# Patient Record
Sex: FEMALE | Race: BLACK OR AFRICAN AMERICAN | NOT HISPANIC OR LATINO | Employment: OTHER | URBAN - METROPOLITAN AREA
[De-identification: names, ages, dates, MRNs, and addresses within clinical notes are randomized per-mention and may not be internally consistent; named-entity substitution may affect disease eponyms.]

---

## 2021-03-11 PROBLEM — Z15.89 MTHFR MUTATION: Status: ACTIVE | Noted: 2021-03-11

## 2021-03-11 PROBLEM — D68.59 HYPERCOAGULABLE STATE (HCC): Status: ACTIVE | Noted: 2021-03-11

## 2021-03-11 PROBLEM — R93.1 ELEVATED CORONARY ARTERY CALCIUM SCORE: Status: ACTIVE | Noted: 2021-03-11

## 2021-03-11 PROBLEM — E78.5 DYSLIPIDEMIA: Status: ACTIVE | Noted: 2021-03-11

## 2021-03-17 PROBLEM — Z92.89 HISTORY OF STRESS TEST: Status: ACTIVE | Noted: 2021-03-17

## 2021-03-17 PROBLEM — R93.1 ELEVATED CORONARY ARTERY CALCIUM SCORE: Chronic | Status: RESOLVED | Noted: 2021-03-11 | Resolved: 2021-03-17

## 2021-03-17 PROBLEM — R93.1 ELEVATED CORONARY ARTERY CALCIUM SCORE: Chronic | Status: ACTIVE | Noted: 2021-03-11

## 2021-03-17 PROBLEM — E78.5 DYSLIPIDEMIA: Chronic | Status: ACTIVE | Noted: 2021-03-11

## 2021-03-18 ENCOUNTER — OFFICE VISIT (OUTPATIENT)
Dept: CARDIOLOGY | Facility: CLINIC | Age: 51
End: 2021-03-18

## 2021-03-18 VITALS
RESPIRATION RATE: 18 BRPM | WEIGHT: 133 LBS | DIASTOLIC BLOOD PRESSURE: 72 MMHG | OXYGEN SATURATION: 99 % | HEIGHT: 66 IN | BODY MASS INDEX: 21.38 KG/M2 | HEART RATE: 70 BPM | SYSTOLIC BLOOD PRESSURE: 110 MMHG

## 2021-03-18 DIAGNOSIS — I25.10 CORONARY ARTERY DISEASE INVOLVING NATIVE CORONARY ARTERY OF NATIVE HEART WITHOUT ANGINA PECTORIS: Primary | Chronic | ICD-10-CM

## 2021-03-18 DIAGNOSIS — E78.5 DYSLIPIDEMIA: Chronic | ICD-10-CM

## 2021-03-18 PROCEDURE — 93000 ELECTROCARDIOGRAM COMPLETE: CPT | Performed by: INTERNAL MEDICINE

## 2021-03-18 PROCEDURE — 99204 OFFICE O/P NEW MOD 45 MIN: CPT | Performed by: INTERNAL MEDICINE

## 2021-03-18 NOTE — PROGRESS NOTES
Chief Complaint  Establish Care, Coronary Artery Disease (Noted on CT 2020), and Hyperlipidemia    Subjective    History of Present Illness    I had the pleasure of seeing Ms. Arellano in the office today.  She is a 51-year-old female who presents to establish cardiac care.  She has a positive family history of premature cardiac disease, dyslipidemia, prediabetes and positive MTHFR gene.  She has recently moved from [place] to contact with to care for her mother.  She is under a great deal of stress.  While in [place] she had a stress echo in 2019 which was normal.  A CTA of the coronary arteries was performed in 2020..  Her total calcium score was 50.  The RCA was normal, PLV was normal, PDA was normal and left main was normal.  The LAD demonstrated calcified plaque in the proximal segment with less than 30% stenosis.  The mid and distal segments were normal.  The circumflex and obtuse marginal were normal.  The aortic and mitral valves were normal.      Ms. Arellano is typically a very active person.  She does enjoy running.  She states that she has not been able to run very much since she has moved back to [place] secondary to her mother's health.  She has noticed some chest discomfort which she describes as a dull sensation in the anterior precordium.  It occurs with rest and exertion.  She does feel it is most likely related to stress.  Duration of the discomfort is just a few minutes.  There is no associated shortness of air, nausea, vomiting or diaphoresis.  She does not complain of shortness of air with exertion or at rest.  She denies orthopnea or paroxysmal nocturnal dyspnea.  She is not experiencing palpitations, dizziness or near syncope.  She has had no history of venous or arterial clots.    Her father had his first myocardial infarction in his fourth decade of life.  He  at age 54.  Her mother has diabetes.  She has a brother has coronary disease and is status post previous  "PCI.  She does not have any known thyroid disease.  No history of rheumatic fever.  No history of tobacco use.  She drinks alcohol occasionally.    Objective   Vital Signs:   /72 (BP Location: Left arm, Patient Position: Sitting, Cuff Size: Large Adult)   Pulse 70   Resp 18   Ht 167.6 cm (66\")   Wt 60.3 kg (133 lb)   SpO2 99%   BMI 21.47 kg/m²     Vitals and nursing note reviewed.   Constitutional:       General: Not in acute distress.     Appearance: Healthy appearance. Well-developed and not in distress. Not diaphoretic.   Eyes:      General: No scleral icterus.     Conjunctiva/sclera: Conjunctivae normal.      Pupils: Pupils are equal, round, and reactive to light.   HENT:      Head: Normocephalic and atraumatic.   Neck:      Thyroid: Thyroid normal. No thyromegaly.      Vascular: JVD normal.      Lymphadenopathy: No cervical adenopathy.   Pulmonary:      Effort: Pulmonary effort is normal. No respiratory distress.      Breath sounds: Normal breath sounds. No wheezing. No rales.   Cardiovascular:      PMI at left midclavicular line. Normal rate. Regular rhythm. Normal S1. Normal S2.      Murmurs: There is no murmur.      No gallop. No S3 and S4 gallop. No click. No rub.   Pulses:     Intact distal pulses. No decreased pulses.   Edema:     Peripheral edema absent.   Abdominal:      General: Bowel sounds are normal. There is no distension or abdominal bruit.      Palpations: Abdomen is soft. There is no hepatomegaly or abdominal mass.      Tenderness: There is no abdominal tenderness. There is no guarding or rebound.   Musculoskeletal: Normal range of motion.      Cervical back: Normal range of motion and neck supple. Skin:     General: Skin is warm and dry.      Coloration: Skin is not pale.      Findings: No rash.   Neurological:      Mental Status: Alert, oriented to person, place, and time and oriented to person, place and time.      Coordination: Coordination normal.      Gait: Gait is intact. "   Psychiatric:         Behavior: Behavior normal.         Result Review :   The following data was reviewed by: Philomena Hines MD on 03/18/2021:  CMP    CMP 2/3/21   Glucose 90   BUN 10   Creatinine 0.7   Sodium 138   Potassium 3.9   Chloride 102   Calcium 9.4   Albumin 4.5   Total Bilirubin 0.5   Alkaline Phosphatase 96   AST (SGOT) 22   ALT (SGPT) 18               TSH    TSH 2/3/21   TSH 0.468         Lab from 2/2021: Triglycerides were 48, total cholesterol 173, HDL 68 and LDL 95.       ECG 12 Lead    Date/Time: 3/18/2021 8:09 PM  Performed by: Philomena Hines MD  Authorized by: Philomena Hines MD   Previous ECG: no previous ECG available  Comments: Normal sinus rhythm, rate 61.  Probable left atrial abnormality.  Right ventricular conduction delay.              Assessment and Plan    1. Coronary artery disease involving native coronary artery of native heart without angina pectoris  CT angiogram in August 2020 revealed a total calcium score 50.  Her LAD demonstrated a proximal 30% calcified lesion otherwise vessels were normal.  She is having chest discomfort which is atypical for myocardial ischemia.  She would benefit from statin therapy given her elevated coronary calcium score and LAD lesion.  She is interested in taking red yeast rice which has a naturally occurring statin.  Her LDL goal should be less than 70.  I have explained to her that I do not know that red yeast rice will be able to obtain this goal.  I have suggested that she take an aspirin 81 mg daily unless she has bleeding issues.  Her blood pressure is controlled.  She is going to make it a goal to start her exercise pattern again.    2. Dyslipidemia  Ms. Arellano states that she has modified her diet and her LDL has decreased from 102 to 95.  Given her risk factors for coronary disease and abnormal CTA, her LDL goal should be less than 70.    Ms. Arellano is stable from a cardiovascular standpoint.  She does have good health habits.  She  should continue primary preventive measures for underlying coronary disease.        Follow Up   No follow-ups on file.  Patient was given instructions and counseling regarding her condition or for health maintenance advice. Please see specific information pulled into the AVS if appropriate.

## 2021-06-22 NOTE — PROGRESS NOTES
"Subjective   Chief Complaint   Patient presents with   • Chest Pain     right sided   • Abdominal Pain     RUQ   • Prediabetes       History of Present Illness   51 y.o. female presents as a new patient to establish care and discuss multiple issues including right sided chest pain; positive smooth muscle AB (referred to Dr. Leonard by Dr. Lakhani but not yet scheduled); RUQ pain; and prediabetes. Moved from [place] to care for her aged mother. Referred by Dr. Philomena Neff. Previously evaluated by Dr. Memo Lakhani.     Right sided chest pain (points to right upper chest closer to right axilla) started last night that lasted 10-15 minutes. Noticed it when she got up to use the restroom. Able to go back to sleep. Radiated to her right neck. Not yet started B-ASA or red yeast. Pain is gone this morning. It made her nervous. Hurt most when she exhaled. She has been lifting things and is taking care of her 86 yo mother. \"Raising the windows is a chore.\" Recently started running again. Working on getting back up to a mile--she's at 17 minutes to run a mile. Denies CP or dyspnea with running.     RUQ after eating ongoing for a few months. Denies nausea, vomiting or diarrhea. Still has her GB. Reports negative US of RUQ 21.  Testing (at [facility]) prior to relocating to [place] revealed +smooth muscle AB but negative MEMO. +MTHFR gene, elevated GGT and CRP (23). Repeated CRP with Dr. Lakhani negative as was ESR. Anti-smooth muscle AB+ and she was advised to see Dr. Leonard--not yet scheduled. Also reports livedo reticularis on legs though it has resolved at this time.     +MTHFR gene. Denies H/O blood clots, stroke or miscarriage. Dad  at age 54 with first MI in his 4th decade. Brother with CAD and PCI. Mother with diabetes. She has made dietary changes and her last A1C was 5.7%. She is followed by Dr. Philomena Neff with recent LDL 95 (goal less than 70 given multiple risk factors and 30% plaque in her LAD); statin " advised but declined infavor of more holistic approach. Advised to start B-ASA but she is reluctant to do so.     Followed by Dr. Leonard--ENT--for chronic vertigo and recurent sinus infections. Recently purchase a humidifier that helped and she has learned the Epley maneuver--this too helps.     P/P with Dr. Solis.     Patient Active Problem List   Diagnosis   • Dyslipidemia   • MTHFR mutation (CMS/HCC)   • Hypercoagulable state (CMS/HCC)   • CAD (coronary artery disease)   • History of stress test       No Known Allergies    Current Outpatient Medications on File Prior to Visit   Medication Sig Dispense Refill   • ascorbic acid (VITAMIN C) 250 MG chewable tablet chewable tablet Chew.     • Calcium Carb-Cholecalciferol 1000-800 MG-UNIT tablet Take  by mouth.     • cholecalciferol (Cholecalciferol) 25 MCG (1000 UT) tablet Take 2,000 Units by mouth Daily.     • folic acid (FOLVITE) 1 MG tablet      • vitamin B-12 (CYANOCOBALAMIN) 1000 MCG tablet Take 1,000 mcg by mouth Daily.     • estradiol (MINIVELLE, VIVELLE-DOT) 0.05 MG/24HR patch PLACE 1 PATCH ON THE SKIN TWICE A WEEK     • Progesterone (PROMETRIUM) 100 MG capsule Take 100 mg by mouth Daily.       No current facility-administered medications on file prior to visit.       Past Medical History:   Diagnosis Date   • CAD (coronary artery disease)    • Hyperlipidemia        Family History   Problem Relation Age of Onset   • Diabetes Mother    • Heart attack Father    • Heart disease Father    • Diabetes Sister    • Heart disease Brother    • Heart disease Maternal Grandmother    • Heart attack Maternal Grandmother        Social History     Socioeconomic History   • Marital status: Single     Spouse name: Not on file   • Number of children: Not on file   • Years of education: Not on file   • Highest education level: Not on file   Tobacco Use   • Smoking status: Never Smoker   • Smokeless tobacco: Never Used   Substance and Sexual Activity   • Alcohol use: Yes      "Alcohol/week: 1.0 standard drinks     Types: 1 Glasses of wine per week   • Drug use: Never   • Sexual activity: Defer       Past Surgical History:   Procedure Laterality Date   • MYOMECTOMY  2008       The following portions of the patient's history were reviewed and updated as appropriate: problem list, allergies, current medications, past medical history, past family history, past social history and past surgical history.    Review of Systems    Immunization History   Administered Date(s) Administered   • COVID-19 (PFIZER) 04/08/2021, 04/29/2021       Objective   Vitals:    06/24/21 0819   BP: 98/58   Pulse: 72   Resp: 14   Temp: 97.5 °F (36.4 °C)   Weight: 61.7 kg (136 lb)   Height: 167.6 cm (66\")     Body mass index is 21.95 kg/m².  Physical Exam  Constitutional:       Appearance: Normal appearance. She is normal weight.   HENT:      Head: Normocephalic and atraumatic.      Mouth/Throat:      Mouth: Mucous membranes are moist.      Pharynx: Oropharynx is clear. No oropharyngeal exudate or posterior oropharyngeal erythema.   Neck:      Thyroid: No thyroid mass, thyromegaly or thyroid tenderness.   Cardiovascular:      Rate and Rhythm: Normal rate and regular rhythm.      Heart sounds: Normal heart sounds. No murmur heard.     Pulmonary:      Effort: Pulmonary effort is normal.      Breath sounds: Normal breath sounds.   Chest:      Comments: Right ICS TTP at RSB.   Abdominal:      General: Abdomen is flat. Bowel sounds are normal. There is no distension.      Palpations: Abdomen is soft.      Tenderness: There is no abdominal tenderness.   Musculoskeletal:      Cervical back: Neck supple.      Comments: Ambulates without assistance; no clubbing, cyanosis or edema.    Lymphadenopathy:      Cervical: No cervical adenopathy.   Skin:     General: Skin is warm and dry.   Neurological:      Mental Status: She is alert.   Psychiatric:         Mood and Affect: Mood normal.         Behavior: Behavior normal. "         Procedures    Assessment/Plan   Diagnoses and all orders for this visit:    1. Right upper quadrant pain (Primary)  Comments:  Negative US GB. HIDA scan advised. Previous LFTs in February negative. Elevated GGT.   Orders:  -     NM HIDA Scan With Pharmacological Intervention    2. Prediabetes  Comments:  Recent A1C 5.7%. Diabetes education advised.   Orders:  -     Ambulatory Referral to Diabetic Education  -     Hemoglobin A1c; Future  -     Basic Metabolic Panel; Future    3. Right-sided chest wall pain  Comments:  Reproducible on exam. Likely costochondritis. Tylenol prn.     4. Arthralgia, unspecified joint  Comments:  Previous MEMO negative. Mother with RA. Recent RF negative as were ESR and CRP. Check anti-CCP prior to follow up.   Orders:  -     Cyclic Citrul Peptide Antibody, IgG / IgA; Future    5. Family history of rheumatoid arthritis  -     Cyclic Citrul Peptide Antibody, IgG / IgA; Future    6. Coronary artery disease involving native coronary artery of native heart without angina pectoris  Comments:  Followed by Dr. Philomena Neff. +MTHFR gene. 30% plaque LAD. +FH CAD with father dying at 54 with MI. LDL goal less than 70.   Orders:  -     rosuvastatin (Crestor) 5 MG tablet; Take 1 tablet by mouth Daily.  Dispense: 30 tablet; Refill: 1    7. MTHFR mutation (CMS/HCC)    8. Dyslipidemia  Comments:  LDL goal less than 70 given multiple risk factors; agreeable to start Crestor 5 mg once weekly  x6 wks & slowly advance with LFTs and FLP in 6 wks  Orders:  -     rosuvastatin (Crestor) 5 MG tablet; Take 1 tablet by mouth Daily.  Dispense: 30 tablet; Refill: 1  -     Hepatic Function Panel; Future    9. Microcytosis  Comments:  Without anemia with previous labs in February. Check CBC, iron panel and ferritin with upcoming lab work.   Orders:  -     CBC & Differential; Future  -     Iron Profile; Future  -     Ferritin; Future    10. Need for hepatitis C screening test  -     Hepatitis C Antibody;  Future    11. Positive Smooth Muscle AB: Advised to schedule with Dr. Leonard (GI) as previously advised.     Tdap in follow up.    68 minutes spent reviewing records, evaluating patient, counseling and coordinating care.     Records reviewed.     Return in about 6 weeks (around 8/5/2021) for Lab B4 FUP.

## 2021-06-24 ENCOUNTER — OFFICE VISIT (OUTPATIENT)
Dept: INTERNAL MEDICINE | Facility: CLINIC | Age: 51
End: 2021-06-24

## 2021-06-24 VITALS
HEIGHT: 66 IN | SYSTOLIC BLOOD PRESSURE: 98 MMHG | WEIGHT: 136 LBS | HEART RATE: 72 BPM | TEMPERATURE: 97.5 F | DIASTOLIC BLOOD PRESSURE: 58 MMHG | BODY MASS INDEX: 21.86 KG/M2 | RESPIRATION RATE: 14 BRPM

## 2021-06-24 DIAGNOSIS — R10.11 RIGHT UPPER QUADRANT PAIN: Primary | ICD-10-CM

## 2021-06-24 DIAGNOSIS — R07.89 RIGHT-SIDED CHEST WALL PAIN: ICD-10-CM

## 2021-06-24 DIAGNOSIS — E78.5 DYSLIPIDEMIA: Chronic | ICD-10-CM

## 2021-06-24 DIAGNOSIS — Z11.59 NEED FOR HEPATITIS C SCREENING TEST: ICD-10-CM

## 2021-06-24 DIAGNOSIS — M25.50 ARTHRALGIA, UNSPECIFIED JOINT: Chronic | ICD-10-CM

## 2021-06-24 DIAGNOSIS — I25.10 CORONARY ARTERY DISEASE INVOLVING NATIVE CORONARY ARTERY OF NATIVE HEART WITHOUT ANGINA PECTORIS: Chronic | ICD-10-CM

## 2021-06-24 DIAGNOSIS — Z15.89 MTHFR MUTATION: ICD-10-CM

## 2021-06-24 DIAGNOSIS — R71.8 MICROCYTOSIS: ICD-10-CM

## 2021-06-24 DIAGNOSIS — R73.03 PREDIABETES: Chronic | ICD-10-CM

## 2021-06-24 DIAGNOSIS — Z82.61 FAMILY HISTORY OF RHEUMATOID ARTHRITIS: ICD-10-CM

## 2021-06-24 PROCEDURE — 99215 OFFICE O/P EST HI 40 MIN: CPT | Performed by: NURSE PRACTITIONER

## 2021-06-24 RX ORDER — LANOLIN ALCOHOL/MO/W.PET/CERES
1000 CREAM (GRAM) TOPICAL DAILY
COMMUNITY
End: 2022-03-16

## 2021-06-24 RX ORDER — MELATONIN
2000 DAILY
COMMUNITY
End: 2022-08-04

## 2021-06-24 RX ORDER — PROGESTERONE 100 MG/1
100 CAPSULE ORAL DAILY
COMMUNITY
End: 2021-09-17

## 2021-06-24 RX ORDER — ESTRADIOL 0.05 MG/D
FILM, EXTENDED RELEASE TRANSDERMAL
COMMUNITY
Start: 2021-04-14 | End: 2021-09-17

## 2021-06-24 RX ORDER — ROSUVASTATIN CALCIUM 5 MG/1
5 TABLET, COATED ORAL DAILY
Qty: 30 TABLET | Refills: 1 | Status: SHIPPED | OUTPATIENT
Start: 2021-06-24 | End: 2021-07-09 | Stop reason: SDUPTHER

## 2021-06-24 RX ORDER — FOLIC ACID 1 MG/1
1 TABLET ORAL DAILY
COMMUNITY
Start: 2021-06-03 | End: 2022-02-03 | Stop reason: SDUPTHER

## 2021-07-02 ENCOUNTER — TELEPHONE (OUTPATIENT)
Dept: CARDIOLOGY | Facility: CLINIC | Age: 51
End: 2021-07-02

## 2021-07-02 DIAGNOSIS — E78.5 DYSLIPIDEMIA: Primary | ICD-10-CM

## 2021-07-09 ENCOUNTER — TELEPHONE (OUTPATIENT)
Dept: INTERNAL MEDICINE | Facility: CLINIC | Age: 51
End: 2021-07-09

## 2021-07-09 DIAGNOSIS — I25.10 CORONARY ARTERY DISEASE INVOLVING NATIVE CORONARY ARTERY OF NATIVE HEART WITHOUT ANGINA PECTORIS: Chronic | ICD-10-CM

## 2021-07-09 DIAGNOSIS — E78.5 DYSLIPIDEMIA: Chronic | ICD-10-CM

## 2021-07-09 RX ORDER — ROSUVASTATIN CALCIUM 5 MG/1
5 TABLET, COATED ORAL DAILY
Qty: 90 TABLET | Refills: 1 | Status: SHIPPED | OUTPATIENT
Start: 2021-07-09 | End: 2022-02-03 | Stop reason: SDUPTHER

## 2021-07-09 NOTE — TELEPHONE ENCOUNTER
Her LDL remains in the 90s.  Would increase rosuvastatin to 10 mg daily.  Will need a repeat lipid profile in approximately 3 months with an ALT and AST.  Those values were mildly elevated but have been elevated in the past as well

## 2021-07-09 NOTE — TELEPHONE ENCOUNTER
Spoke to pt today; she will schedule with us once we have dietitian in place and taking appts (3-4 weeks); she also asked if Dr Angel's office could refer somewhere quicker, she may do both appts.  Patient wants to know if there is anyone else to refer to?

## 2021-07-09 NOTE — TELEPHONE ENCOUNTER
FYI  Patient never started the Rosuvastatin 5 mg, she agrees to start the medication & repeat labs in 3 months.

## 2021-07-26 ENCOUNTER — HOSPITAL ENCOUNTER (EMERGENCY)
Facility: HOSPITAL | Age: 51
Discharge: HOME | End: 2021-07-26

## 2021-07-26 VITALS
HEART RATE: 72 BPM | RESPIRATION RATE: 16 BRPM | DIASTOLIC BLOOD PRESSURE: 58 MMHG | TEMPERATURE: 98.4 F | SYSTOLIC BLOOD PRESSURE: 91 MMHG | OXYGEN SATURATION: 100 %

## 2021-07-26 PROCEDURE — 99211 OFF/OP EST MAY X REQ PHY/QHP: CPT

## 2021-08-10 ENCOUNTER — TELEPHONE (OUTPATIENT)
Dept: CARDIOLOGY | Facility: CLINIC | Age: 51
End: 2021-08-10

## 2021-08-10 PROBLEM — Z15.89 MTHFR MUTATION: Chronic | Status: ACTIVE | Noted: 2021-03-11

## 2021-08-10 NOTE — PROGRESS NOTES
"Chief Complaint  Coronary Artery Disease    Subjective    History of Present Illness      You have chosen to receive care through a telephone visit. Do you consent to use a telephone visit for your medical care today? Yes  I had a telephone visit with Ms. Arellano today.    She is a 51-year-old female with a positive family history of premature cardiac disease, dyslipidemia, prediabetes and positive MTHFR gene.  She had a stress echo in September 2019 which was normal.  She had a coronary calcium scan with a total score of 50.    Ms. Arellano states that for the past 2 weeks she has been experiencing intermittent chest discomfort.  She states that the discomfort begins in the middle of her chest and expands and she can feel this in the back of her chest.  She describes it as a sharp and burning type of discomfort and the duration is 5 to 10 minutes.  It is not precipitated by exertion but occurs at rest.  She also states that she has occasional shortness of air where she feels like she has a natural breath for a few seconds.  She does run on a routine basis and has a sensation of shortness of breath when she begins to run but then the longer she runs that he is here is breathing.  She has no discomfort in her chest when she is running.  She also complains of intermittent palpitations.  The palpitations are not causing dizziness or near syncope.  She also seems very sensitive dizziness.  The dizziness is not precipitated by any particular measures.  She just describes as a sensation of lightheadedness.      Objective   Vital Signs:   Ht 167.6 cm (66\")   Wt 61.2 kg (135 lb)   BMI 21.79 kg/m²     Physical Exam    Physical exam was not performed secondary to telephone visit  Result Review :   The following data was reviewed by: Philomena Hines MD on 08/11/2021:  CMP    CMP 2/3/21 8/9/21 8/9/21     1313 1313   Glucose 90  94   BUN 10  12   Creatinine 0.7  0.87   eGFR Non  Am   69   eGFR African Am   84   Sodium 138  " 142   Potassium 3.9  4.7   Chloride 102  104   Calcium 9.4  10.4   Total Protein  7.6    Albumin 4.5 4.60    Total Bilirubin 0.5 0.4    Alkaline Phosphatase 96 134 (A)    AST (SGOT) 22 40 (A)    ALT (SGPT) 18 60 (A)    (A) Abnormal value       Comments are available for some flowsheets but are not being displayed.               TSH    TSH 2/3/21   TSH 0.468         Lipid profile from 7/1/2021: Total cholesterol 177, HDL 66, triglycerides 88, LDL 93.  AST was 39 and ALT was 47            Assessment and Plan    1. Chest discomfort  Ms. Arellano has been experiencing chest discomfort.  The character of the discomfort is typical of angina.  The presentation of the discomfort is atypical.  Symptoms do not occur with exertion but occur with rest.  She will need further evaluation and I will schedule her to have a stress nuclear study.  She did have a CT angiogram 2019 which revealed nonobstructive disease.  There was a 30% stenosis in the proximal LAD.    2. Dyslipidemia  Ms. Arellano has been reluctant to start treatment for her LDL.  She has tried diet modification and exercise.  Her recent lipid profile from July of March 2021 revealed her total cholesterol being 177, HDL 66, triglycerides 88 and LDL 93.  She was started on Crestor 5 mg daily in early July results of the lipid profile returned.    3. Elevated LFTs  She had recent lab work on August 9, 2021.  Her ALT was 60 and AST was 40.  Her alkaline phosphatase was 134.  She was started on rosuvastatin 5 mg daily approximately 1 month ago.  However, her AST and ALT were elevated prior to initiation of statin therapy.  Her ALT was 47 and AST 39.  She is going to be evaluated further.    4.  Palpitations  She is having occasional palpitations.  The symptoms are not causing dizziness or near syncope.  We will watch for now.  If the palpitations become more frequent we will place a monitor.    Length of telephone visit was 12 minutes 11 seconds.                Follow Up   No  follow-ups on file.  Patient was given instructions and counseling regarding her condition or for health maintenance advice. Please see specific information pulled into the AVS if appropriate.

## 2021-08-11 ENCOUNTER — TELEMEDICINE (OUTPATIENT)
Dept: CARDIOLOGY | Facility: CLINIC | Age: 51
End: 2021-08-11

## 2021-08-11 ENCOUNTER — OFFICE (OUTPATIENT)
Dept: URBAN - METROPOLITAN AREA CLINIC 75 | Facility: CLINIC | Age: 51
End: 2021-08-11
Payer: MEDICARE

## 2021-08-11 VITALS — BODY MASS INDEX: 21.69 KG/M2 | HEIGHT: 66 IN | WEIGHT: 135 LBS

## 2021-08-11 VITALS
SYSTOLIC BLOOD PRESSURE: 102 MMHG | HEIGHT: 66 IN | HEART RATE: 76 BPM | OXYGEN SATURATION: 99 % | RESPIRATION RATE: 18 BRPM | DIASTOLIC BLOOD PRESSURE: 68 MMHG | WEIGHT: 136 LBS

## 2021-08-11 DIAGNOSIS — R79.89 ELEVATED LFTS: ICD-10-CM

## 2021-08-11 DIAGNOSIS — E78.5 DYSLIPIDEMIA: Chronic | ICD-10-CM

## 2021-08-11 DIAGNOSIS — R10.11 RIGHT UPPER QUADRANT PAIN: ICD-10-CM

## 2021-08-11 DIAGNOSIS — K62.5 HEMORRHAGE OF ANUS AND RECTUM: ICD-10-CM

## 2021-08-11 DIAGNOSIS — R14.0 ABDOMINAL DISTENSION (GASEOUS): ICD-10-CM

## 2021-08-11 DIAGNOSIS — R07.89 CHEST DISCOMFORT: Primary | ICD-10-CM

## 2021-08-11 DIAGNOSIS — R93.1 AGATSTON CORONARY ARTERY CALCIUM SCORE LESS THAN 100: Chronic | ICD-10-CM

## 2021-08-11 DIAGNOSIS — Z83.71 FAMILY HISTORY OF COLONIC POLYPS: ICD-10-CM

## 2021-08-11 PROBLEM — R23.1 LIVEDO RETICULARIS: Status: ACTIVE | Noted: 2021-01-01

## 2021-08-11 PROBLEM — R00.2 PALPITATIONS: Status: ACTIVE | Noted: 2021-08-11

## 2021-08-11 PROBLEM — D57.3 SICKLE-CELL TRAIT: Status: ACTIVE | Noted: 2017-08-22

## 2021-08-11 PROBLEM — F32.81 PMDD (PREMENSTRUAL DYSPHORIC DISORDER): Status: ACTIVE | Noted: 2017-08-22

## 2021-08-11 PROBLEM — R73.03 PREDIABETES: Status: ACTIVE | Noted: 2021-02-03

## 2021-08-11 PROBLEM — R91.1 LUNG NODULE SEEN ON IMAGING STUDY: Status: ACTIVE | Noted: 2018-01-13

## 2021-08-11 PROBLEM — J32.2 SINUSITIS CHRONIC, ETHMOIDAL: Status: ACTIVE | Noted: 2020-03-02

## 2021-08-11 PROBLEM — K76.89 AUTOIMMUNE LIVER DISEASE: Status: ACTIVE | Noted: 2020-05-01

## 2021-08-11 PROCEDURE — 99442 PR PHYS/QHP TELEPHONE EVALUATION 11-20 MIN: CPT | Performed by: INTERNAL MEDICINE

## 2021-08-11 PROCEDURE — 99204 OFFICE O/P NEW MOD 45 MIN: CPT | Performed by: INTERNAL MEDICINE

## 2021-08-11 RX ORDER — PANTOPRAZOLE SODIUM 40 MG/1
TABLET, DELAYED RELEASE ORAL
Qty: 30 | Refills: 11 | Status: ACTIVE
Start: 2021-08-11

## 2021-08-11 NOTE — PROGRESS NOTES
Chief Complaint   Patient presents with   • Anxiety   • Fatigue   • Hyperlipidemia       History of Present Illness   51 y.o. female presents for follow up regarding prediabetes, HLD, anxiety and fatigue.     Telehealth visit with Dr. Neff yesterday regarding chest discomfort (2 weeks), lightheadedness and intermittent palpitations; nuclear stress test to be scheduled. CTA 2019 revealed non-obstructive disease with 30% stenosis in the proximal LAD. Continues Crestor 5 mg daily which was started in early July. ALT 60 and AST 40 this week and noted to be elevated at 47 and 39 prior to initiation of statin.     Established with Dr. Leonard yesterday. She did advise to continue Crestor with her history of CAD.     Taking care of her mom is hard and she is struggling with anxiety. She has been treated in the past with Paxil and Zoloft. Seeing a counselor at her mother's [facility] but would like the names of a few therapists.     RUQ pain after eating for several months--HIDA scheduled in September.     She is prediabetic. She has made dietary changes and continues to exercise.     Patient Active Problem List   Diagnosis   • Agatston coronary artery calcium score less than 100   • Dyslipidemia   • MTHFR mutation (CMS/HCC)   • Hypercoagulable state (CMS/HCC)   • History of stress test   • Chest discomfort   • Elevated LFTs   • Palpitations   • PMDD (premenstrual dysphoric disorder)   • Prediabetes   • Sickle-cell trait (CMS/HCC)   • Lung nodule seen on imaging study   • Sinusitis chronic, ethmoidal   • Livedo reticularis   • Autoimmune liver disease       No Known Allergies    Current Outpatient Medications on File Prior to Visit   Medication Sig Dispense Refill   • ascorbic acid (VITAMIN C) 250 MG chewable tablet chewable tablet Chew.     • Calcium Carb-Cholecalciferol 1000-800 MG-UNIT tablet Take  by mouth.     • cholecalciferol (Cholecalciferol) 25 MCG (1000 UT) tablet Take 2,000 Units by mouth Daily.     • folic acid  "(FOLVITE) 1 MG tablet Take 1 mg by mouth Daily.     • rosuvastatin (Crestor) 5 MG tablet Take 1 tablet by mouth Daily. 90 tablet 1   • vitamin B-12 (CYANOCOBALAMIN) 1000 MCG tablet Take 1,000 mcg by mouth Daily.     • estradiol (MINIVELLE, VIVELLE-DOT) 0.05 MG/24HR patch PLACE 1 PATCH ON THE SKIN TWICE A WEEK     • Progesterone (PROMETRIUM) 100 MG capsule Take 100 mg by mouth Daily.       No current facility-administered medications on file prior to visit.       Past Medical History:   Diagnosis Date   • CAD (coronary artery disease)    • Hyperlipidemia        Family History   Problem Relation Age of Onset   • Diabetes Mother    • Heart attack Father    • Heart disease Father    • Diabetes Sister    • Heart disease Brother    • Heart disease Maternal Grandmother    • Heart attack Maternal Grandmother        Social History     Socioeconomic History   • Marital status: Single     Spouse name: Not on file   • Number of children: Not on file   • Years of education: Not on file   • Highest education level: Not on file   Tobacco Use   • Smoking status: Never Smoker   • Smokeless tobacco: Never Used   Substance and Sexual Activity   • Alcohol use: Yes     Alcohol/week: 1.0 standard drinks     Types: 1 Glasses of wine per week   • Drug use: Never   • Sexual activity: Defer       Past Surgical History:   Procedure Laterality Date   • MYOMECTOMY  2008       The following portions of the patient's history were reviewed and updated as appropriate: problem list, allergies, current medications, past medical history and past social history.    Review of Systems    Immunization History   Administered Date(s) Administered   • COVID-19 (PFIZER) 04/08/2021, 04/29/2021       Objective   Vitals:    08/12/21 1133   BP: 100/62   Pulse: 76   Resp: 16   Temp: 97.3 °F (36.3 °C)   Weight: 61.7 kg (136 lb)   Height: 167.6 cm (66\")     Body mass index is 21.95 kg/m².  Physical Exam  Vitals reviewed.   Constitutional:       Appearance: Normal " appearance. She is well-developed.   HENT:      Head: Normocephalic and atraumatic.   Cardiovascular:      Rate and Rhythm: Normal rate and regular rhythm.      Heart sounds: Normal heart sounds, S1 normal and S2 normal.   Pulmonary:      Effort: Pulmonary effort is normal.      Breath sounds: Normal breath sounds.   Skin:     General: Skin is warm and dry.   Neurological:      Mental Status: She is alert.   Psychiatric:         Mood and Affect: Mood normal.         Behavior: Behavior normal.         Procedures    Assessment/Plan   Diagnoses and all orders for this visit:    1. Anxiety (Primary)  Comments:  Previously treated and controlled with Sertraline 25 mg daily. Restart today and RTO in 6-8 week to see how she is progressing. Lists of therapists provided.   Orders:  -     sertraline (Zoloft) 25 MG tablet; Take 1 tablet by mouth Daily.  Dispense: 90 tablet; Refill: 0    2. Dyslipidemia  Comments:  Tolerating Crestor 5 mg daily. Slight increase in AST/ALT--will follow. Repeat FLP and HFP prior to OV in October.   Orders:  -     Lipid Panel With / Chol / HDL Ratio; Future  -     Hepatic Function Panel; Future    3. Fatigue, unspecified type  Comments:  Recent labs reviewed.  Check TSH and free T4. Dr. Leonard is working her up for autoimmune liver disease.   Orders:  -     TSH  -     T4, free    4. Prediabetes  Comments:  A1C 5.9%. Continue with dietary changes and exercise once results of stress tests reviewed.     5. Lead exposure  Comments:  Advised by the city she has potentially had a lead exposure with her water pipes.   Orders:  -     Lead, Blood    6. Right upper quadrant pain  Comments:  HIDA scan scheduled in September.     Records reviewed include previous OV with myself as well as labs.     Return in about 8 weeks (around 10/7/2021) for Lab B4 FUP.

## 2021-08-12 ENCOUNTER — OFFICE VISIT (OUTPATIENT)
Dept: INTERNAL MEDICINE | Facility: CLINIC | Age: 51
End: 2021-08-12

## 2021-08-12 VITALS
WEIGHT: 136 LBS | DIASTOLIC BLOOD PRESSURE: 62 MMHG | RESPIRATION RATE: 16 BRPM | HEIGHT: 66 IN | HEART RATE: 76 BPM | SYSTOLIC BLOOD PRESSURE: 100 MMHG | TEMPERATURE: 97.3 F | BODY MASS INDEX: 21.86 KG/M2

## 2021-08-12 DIAGNOSIS — R10.11 RIGHT UPPER QUADRANT PAIN: ICD-10-CM

## 2021-08-12 DIAGNOSIS — R53.83 FATIGUE, UNSPECIFIED TYPE: ICD-10-CM

## 2021-08-12 DIAGNOSIS — F41.9 ANXIETY: Primary | Chronic | ICD-10-CM

## 2021-08-12 DIAGNOSIS — R73.03 PREDIABETES: Chronic | ICD-10-CM

## 2021-08-12 DIAGNOSIS — Z77.011 LEAD EXPOSURE: ICD-10-CM

## 2021-08-12 DIAGNOSIS — E78.5 DYSLIPIDEMIA: Chronic | ICD-10-CM

## 2021-08-12 PROCEDURE — 99214 OFFICE O/P EST MOD 30 MIN: CPT | Performed by: NURSE PRACTITIONER

## 2021-08-12 RX ORDER — SERTRALINE HYDROCHLORIDE 25 MG/1
25 TABLET, FILM COATED ORAL DAILY
Qty: 90 TABLET | Refills: 0 | Status: SHIPPED | OUTPATIENT
Start: 2021-08-12 | End: 2021-10-07

## 2021-08-16 LAB
LEAD BLDV-MCNC: 1 UG/DL (ref 0–4)
T4 FREE SERPL-MCNC: 1.33 NG/DL (ref 0.93–1.7)
TSH SERPL DL<=0.005 MIU/L-ACNC: 0.81 UIU/ML (ref 0.27–4.2)

## 2021-08-19 ENCOUNTER — APPOINTMENT (OUTPATIENT)
Dept: DIABETES SERVICES | Facility: HOSPITAL | Age: 51
End: 2021-08-19

## 2021-08-20 ENCOUNTER — TELEPHONE (OUTPATIENT)
Dept: CARDIOLOGY | Facility: CLINIC | Age: 51
End: 2021-08-20

## 2021-08-20 NOTE — TELEPHONE ENCOUNTER
Patient called in on 08/20/2021 not wanting a nuclear stress test w myocardial perfusion bu instead an exercise stress test. Patient wants to avoid an injections that she can.     Her stress/echo is currently scheduled for 10/01/2021.     Please adviseYimi

## 2021-08-20 NOTE — TELEPHONE ENCOUNTER
Pt is wishing to speak with you re: her stress test, she is needing to know why there are 2 injections that she needs to take & was upset that I wasn't able to speak with her about the injections. I informed her that I am not privy to the injections that  are used for these tests because I am not involved with that aspect in the hospital.

## 2021-08-20 NOTE — TELEPHONE ENCOUNTER
We can change to a stress test but will been have a 50% chance of having a false positive. If she thinks she can exercise for a long period of time we can order a stress echo.

## 2021-08-24 ENCOUNTER — HOSPITAL ENCOUNTER (OUTPATIENT)
Dept: NUCLEAR MEDICINE | Facility: HOSPITAL | Age: 51
Discharge: HOME | End: 2021-08-24

## 2021-08-24 PROCEDURE — 25010000002 SINCALIDE PER 5 MCG: Performed by: NURSE PRACTITIONER

## 2021-08-24 PROCEDURE — 0 TECHNETIUM TC 99M MEBROFENIN KIT: Performed by: NURSE PRACTITIONER

## 2021-08-24 PROCEDURE — A9537 TC99M MEBROFENIN: HCPCS | Performed by: NURSE PRACTITIONER

## 2021-08-24 PROCEDURE — 78227 HEPATOBIL SYST IMAGE W/DRUG: CPT

## 2021-08-24 RX ORDER — KIT FOR THE PREPARATION OF TECHNETIUM TC 99M MEBROFENIN 45 MG/10ML
1 INJECTION, POWDER, LYOPHILIZED, FOR SOLUTION INTRAVENOUS
Status: COMPLETED | OUTPATIENT
Start: 2021-08-24 | End: 2021-08-24

## 2021-08-24 RX ADMIN — MEBROFENIN 1 DOSE: 45 INJECTION, POWDER, LYOPHILIZED, FOR SOLUTION INTRAVENOUS at 08:54

## 2021-08-24 RX ADMIN — SINCALIDE 1.2 MCG: 5 INJECTION, POWDER, LYOPHILIZED, FOR SOLUTION INTRAVENOUS at 09:59

## 2021-09-02 ENCOUNTER — APPOINTMENT (OUTPATIENT)
Dept: NUCLEAR MEDICINE | Facility: HOSPITAL | Age: 51
End: 2021-09-02

## 2021-09-17 ENCOUNTER — TELEMEDICINE (OUTPATIENT)
Dept: CARDIOLOGY | Facility: CLINIC | Age: 51
End: 2021-09-17

## 2021-09-17 VITALS — HEIGHT: 66 IN | WEIGHT: 136 LBS | BODY MASS INDEX: 21.86 KG/M2

## 2021-09-17 DIAGNOSIS — E78.5 DYSLIPIDEMIA: ICD-10-CM

## 2021-09-17 DIAGNOSIS — R42 LIGHTHEADEDNESS: Primary | ICD-10-CM

## 2021-09-17 DIAGNOSIS — D57.3 SICKLE-CELL TRAIT (HCC): ICD-10-CM

## 2021-09-17 DIAGNOSIS — R00.2 PALPITATIONS: ICD-10-CM

## 2021-09-17 DIAGNOSIS — R93.1 AGATSTON CORONARY ARTERY CALCIUM SCORE LESS THAN 100: ICD-10-CM

## 2021-09-17 DIAGNOSIS — D68.59 HYPERCOAGULABLE STATE (HCC): ICD-10-CM

## 2021-09-17 PROCEDURE — 99214 OFFICE O/P EST MOD 30 MIN: CPT | Performed by: INTERNAL MEDICINE

## 2021-09-17 RX ORDER — ASPIRIN 81 MG/1
1 TABLET ORAL DAILY
COMMUNITY
Start: 2021-08-01 | End: 2022-08-04

## 2021-09-17 NOTE — PROGRESS NOTES
"Chief Complaint  Coronary Artery Disease    Subjective    History of Present Illness      You have chosen to receive care through a telehealth visit.  Do you consent to use a video/audio connection for your medical care today? Yes    Darlene Arellano is a pleasant 51-year-old female who reports recurrent lightheadedness and palpitations.  She states that this occurs after she gets up in the morning and can last all day.  She does not report chest pain pressure or tightness.  She is active running up to half a mile free of any significant dyspnea.  She does not report orthopnea or PND.  There is been no reginaldo syncope.  She does report a history of anxiety and has known hyperlipidemia.  She observes a low-cholesterol low saturated fat diet and remains on rosuvastatin 5 mg daily.    Objective   Vital Signs:   Ht 167.6 cm (66\")   Wt 61.7 kg (136 lb)   BMI 21.95 kg/m²     Physical Exam not performed  Result Review :     Common labs    Common Labsle 2/3/21 2/3/21 2/3/21 8/9/21 8/9/21 8/9/21 8/9/21    1441 1441 1441 1313 1313 1313 1313   Glucose 90      94   BUN 10      12   Creatinine 0.7      0.87   eGFR Non  Am       69   eGFR African Am       84   Sodium 138      142   Potassium 3.9      4.7   Chloride 102      104   Calcium 9.4      10.4   Total Protein    7.6      Albumin 4.5   4.60      Total Bilirubin 0.5   0.4      Alkaline Phosphatase 96   134 (A)      AST (SGOT) 22   40 (A)      ALT (SGPT) 18   60 (A)      WBC  4.54    3.99    Hemoglobin  13.0    14.1    Hematocrit  40.5    44.2    Platelets  252    239    Hemoglobin A1C   5.7 (A)  5.90 (A)     (A) Abnormal value       Comments are available for some flowsheets but are not being displayed.                     Assessment and Plan    1. Lightheadedness  Recurrent and symptomatic    2. Palpitations  Recurrent and symptomatic    3. Agatston coronary artery calcium score less than 100  Stable    4. Dyslipidemia  Continue low-cholesterol low saturated fat diet " and statin therapy    5. Hypercoagulable state (CMS/HCC)  Stable    6. Sickle-cell trait (CMS/HCC)  Stable    Darlene is free of angina denies any respiratory insufficiency.  She does however have recurrent symptomatic lightheadedness and palpitations.  We will equip her with a 2-week ambulatory EKG monitor.  I will assess fasting lipid profile liver function test.  I will plan to see her in follow-up in 4 weeks sooner if needed.      I spent 18 minutes caring for Darlene on this date of service and 10 minutes completing electronic medical record. This time includes time spent by me in the following activities:preparing for the visit, counseling and educating the patient/family/caregiver, ordering medications, tests, or procedures and documenting information in the medical record  Follow Up   No follow-ups on file.  Patient was given instructions and counseling regarding her condition or for health maintenance advice. Please see specific information pulled into the AVS if appropriate.

## 2021-09-28 DIAGNOSIS — E78.5 DYSLIPIDEMIA: Chronic | ICD-10-CM

## 2021-09-29 LAB
ALBUMIN SERPL-MCNC: 4.8 G/DL (ref 3.5–5.2)
ALP SERPL-CCNC: 123 U/L (ref 39–117)
ALT SERPL-CCNC: 35 U/L (ref 1–33)
AST SERPL-CCNC: 24 U/L (ref 1–32)
BILIRUB DIRECT SERPL-MCNC: 0.2 MG/DL (ref 0–0.3)
BILIRUB SERPL-MCNC: 0.5 MG/DL (ref 0–1.2)
CHOLEST SERPL-MCNC: 150 MG/DL (ref 0–200)
CHOLEST/HDLC SERPL: 2.27 {RATIO}
HDLC SERPL-MCNC: 66 MG/DL (ref 40–60)
LDLC SERPL CALC-MCNC: 73 MG/DL (ref 0–100)
PROT SERPL-MCNC: 7.3 G/DL (ref 6–8.5)
TRIGL SERPL-MCNC: 48 MG/DL (ref 0–150)
VLDLC SERPL CALC-MCNC: 11 MG/DL (ref 5–40)

## 2021-10-01 ENCOUNTER — APPOINTMENT (OUTPATIENT)
Dept: CARDIOLOGY | Facility: HOSPITAL | Age: 51
End: 2021-10-01

## 2021-10-01 ENCOUNTER — APPOINTMENT (OUTPATIENT)
Dept: NUCLEAR MEDICINE | Facility: HOSPITAL | Age: 51
End: 2021-10-01

## 2021-10-06 PROBLEM — F41.9 ANXIETY: Status: ACTIVE | Noted: 2021-10-06

## 2021-10-06 PROBLEM — D57.3 SICKLE-CELL TRAIT (HCC): Chronic | Status: ACTIVE | Noted: 2017-08-22

## 2021-10-06 PROBLEM — D68.59 HYPERCOAGULABLE STATE: Chronic | Status: ACTIVE | Noted: 2021-03-11

## 2021-10-06 PROBLEM — R73.03 PREDIABETES: Chronic | Status: ACTIVE | Noted: 2021-02-03

## 2021-10-06 PROBLEM — F32.81 PMDD (PREMENSTRUAL DYSPHORIC DISORDER): Chronic | Status: ACTIVE | Noted: 2017-08-22

## 2021-10-06 RX ORDER — PANTOPRAZOLE SODIUM 40 MG/1
TABLET, DELAYED RELEASE ORAL
COMMUNITY
Start: 2021-09-16 | End: 2022-01-30

## 2021-10-06 NOTE — PROGRESS NOTES
Subjective   Chief Complaint   Patient presents with   • Anxiety   • Dizziness       History of Present Illness   51 y.o. female presents for follow up regarding HLD and anxiety for which sertraline 25 mg daily was started in August. She is tolerating Crestor daily and tolerating it well. Never started Zoloft. She would like to try to Effexor instead as she is also having hot flashes. She has found a good counselor.     Scheduled for stress test tomorrow  As well as echo and ZIO patch ordered after visit with Dr. Hines for ongoing lightheadedness. Strong FH heart disease with father dying in front of her when he was 50 yo of MI. Denies CP, dyspnea or syncope. Runs 1/2 to one mile daily without issues. She will see Dr. Hines 10/19.     Declines influenza.      Patient Active Problem List   Diagnosis   • Agatston coronary artery calcium score less than 100   • Dyslipidemia   • MTHFR mutation   • Hypercoagulable state (HCC)   • History of stress test   • Chest discomfort   • Elevated LFTs   • Palpitations   • PMDD (premenstrual dysphoric disorder)   • Prediabetes   • Sickle-cell trait (HCC)   • Lung nodule seen on imaging study   • Sinusitis chronic, ethmoidal   • Livedo reticularis   • Autoimmune liver disease   • Lightheadedness   • Anxiety       No Known Allergies    Current Outpatient Medications on File Prior to Visit   Medication Sig Dispense Refill   • ascorbic acid (VITAMIN C) 250 MG chewable tablet chewable tablet Chew.     • aspirin (Aspirin Adult Low Dose) 81 MG EC tablet Take 1 tablet by mouth Daily.     • cholecalciferol (Cholecalciferol) 25 MCG (1000 UT) tablet Take 2,000 Units by mouth Daily.     • folic acid (FOLVITE) 1 MG tablet Take 1 mg by mouth Daily.     • Iron, Ferrous Sulfate, 325 (65 Fe) MG tablet Take 1 tablet by mouth Daily.     • rosuvastatin (Crestor) 5 MG tablet Take 1 tablet by mouth Daily. 90 tablet 1   • vitamin B-12 (CYANOCOBALAMIN) 1000 MCG tablet Take 1,000 mcg by mouth Daily.    "  • pantoprazole (PROTONIX) 40 MG EC tablet      • [DISCONTINUED] sertraline (Zoloft) 25 MG tablet Take 1 tablet by mouth Daily. 90 tablet 0     No current facility-administered medications on file prior to visit.       Past Medical History:   Diagnosis Date   • CAD (coronary artery disease)        Family History   Problem Relation Age of Onset   • Diabetes Mother    • Heart attack Father    • Heart disease Father    • Diabetes Sister    • Heart disease Brother    • Heart disease Maternal Grandmother    • Heart attack Maternal Grandmother        Social History     Socioeconomic History   • Marital status: Single     Spouse name: Not on file   • Number of children: Not on file   • Years of education: Not on file   • Highest education level: Not on file   Tobacco Use   • Smoking status: Never Smoker   • Smokeless tobacco: Never Used   Substance and Sexual Activity   • Alcohol use: Yes     Alcohol/week: 1.0 standard drinks     Types: 1 Glasses of wine per week   • Drug use: Never   • Sexual activity: Defer       Past Surgical History:   Procedure Laterality Date   • MYOMECTOMY  2008       The following portions of the patient's history were reviewed and updated as appropriate: problem list, allergies, current medications, past medical history and past social history.    Review of Systems    Immunization History   Administered Date(s) Administered   • COVID-19 (PFIZER) 04/08/2021, 04/29/2021       Objective   Vitals:    10/07/21 1119   BP: 96/62   Pulse: 84   Resp: 12   Temp: 97.5 °F (36.4 °C)   Weight: 62.6 kg (138 lb)   Height: 167.6 cm (66\")     Body mass index is 22.27 kg/m².  Physical Exam  Vitals reviewed.   Constitutional:       Appearance: Normal appearance. She is well-developed.   HENT:      Head: Normocephalic and atraumatic.   Cardiovascular:      Rate and Rhythm: Normal rate and regular rhythm.      Heart sounds: Normal heart sounds, S1 normal and S2 normal.   Pulmonary:      Effort: Pulmonary effort is " normal.      Breath sounds: Normal breath sounds.   Skin:     General: Skin is warm and dry.   Neurological:      Mental Status: She is alert.   Psychiatric:         Mood and Affect: Mood normal.         Behavior: Behavior normal.         Procedures    Assessment/Plan   Diagnoses and all orders for this visit:    1. Anxiety (Primary)  Comments:  Never started Zoloft and would prefer Effexor to also help with menopausal symptoms.   Orders:  -     venlafaxine XR (Effexor XR) 37.5 MG 24 hr capsule; Take 1 capsule by mouth Daily.  Dispense: 90 capsule; Refill: 0    2. Dyslipidemia  Comments:  Improved with daily Crestor 5 mg daily which she will continue.   Orders:  -     Lipid Panel With / Chol / HDL Ratio; Future    3. Prediabetes  Comments:  Repeat A1C in 6 months as below. Continues to make dietary changes.   Orders:  -     Hemoglobin A1c; Future  -     Comprehensive Metabolic Panel; Future    4. Lightheadedness  Comments:  Recent visit with Dr. Hines with ZIO patch, NM stress test and echo ordered.   Orders:  -     CBC & Differential    5. MTHFR mutation  -     Homocysteine    Records reviewed include previous OV with myself and Emery Hines and Irma as well as labs.     Return in about 8 weeks (around 12/2/2021) for Lab Today.

## 2021-10-07 ENCOUNTER — OFFICE VISIT (OUTPATIENT)
Dept: INTERNAL MEDICINE | Facility: CLINIC | Age: 51
End: 2021-10-07

## 2021-10-07 VITALS
BODY MASS INDEX: 22.18 KG/M2 | TEMPERATURE: 97.5 F | HEART RATE: 84 BPM | RESPIRATION RATE: 12 BRPM | SYSTOLIC BLOOD PRESSURE: 96 MMHG | HEIGHT: 66 IN | DIASTOLIC BLOOD PRESSURE: 62 MMHG | WEIGHT: 138 LBS

## 2021-10-07 DIAGNOSIS — R73.03 PREDIABETES: Chronic | ICD-10-CM

## 2021-10-07 DIAGNOSIS — Z15.89 MTHFR MUTATION: ICD-10-CM

## 2021-10-07 DIAGNOSIS — F41.9 ANXIETY: Primary | Chronic | ICD-10-CM

## 2021-10-07 DIAGNOSIS — R42 LIGHTHEADEDNESS: Chronic | ICD-10-CM

## 2021-10-07 DIAGNOSIS — E78.5 DYSLIPIDEMIA: Chronic | ICD-10-CM

## 2021-10-07 PROCEDURE — 99214 OFFICE O/P EST MOD 30 MIN: CPT | Performed by: NURSE PRACTITIONER

## 2021-10-07 RX ORDER — VENLAFAXINE HYDROCHLORIDE 37.5 MG/1
37.5 CAPSULE, EXTENDED RELEASE ORAL DAILY
Qty: 90 CAPSULE | Refills: 0 | Status: SHIPPED | OUTPATIENT
Start: 2021-10-07 | End: 2022-02-01

## 2021-10-08 ENCOUNTER — APPOINTMENT (OUTPATIENT)
Dept: NUCLEAR MEDICINE | Facility: HOSPITAL | Age: 51
End: 2021-10-08

## 2021-10-08 ENCOUNTER — HOSPITAL ENCOUNTER (OUTPATIENT)
Dept: CARDIOLOGY | Facility: HOSPITAL | Age: 51
Discharge: HOME | End: 2021-10-08
Admitting: INTERNAL MEDICINE

## 2021-10-08 VITALS
WEIGHT: 138 LBS | SYSTOLIC BLOOD PRESSURE: 111 MMHG | HEIGHT: 66 IN | BODY MASS INDEX: 22.18 KG/M2 | DIASTOLIC BLOOD PRESSURE: 70 MMHG

## 2021-10-08 DIAGNOSIS — R07.89 CHEST DISCOMFORT: ICD-10-CM

## 2021-10-08 LAB
AORTIC DIMENSIONLESS INDEX: 0.7 (DI)
BASOPHILS # BLD AUTO: 0.04 10*3/MM3 (ref 0–0.2)
BASOPHILS NFR BLD AUTO: 0.7 % (ref 0–1.5)
BH CV ECHO MEAS - AO MAX PG (FULL): 2.8 MMHG
BH CV ECHO MEAS - AO MAX PG: 5.4 MMHG
BH CV ECHO MEAS - AO MEAN PG (FULL): 2 MMHG
BH CV ECHO MEAS - AO MEAN PG: 3 MMHG
BH CV ECHO MEAS - AO ROOT AREA (BSA CORRECTED): 1.6
BH CV ECHO MEAS - AO ROOT AREA: 5.7 CM^2
BH CV ECHO MEAS - AO ROOT DIAM: 2.7 CM
BH CV ECHO MEAS - AO V2 MAX: 116 CM/SEC
BH CV ECHO MEAS - AO V2 MEAN: 77.2 CM/SEC
BH CV ECHO MEAS - AO V2 VTI: 23.9 CM
BH CV ECHO MEAS - AVA(I,A): 2 CM^2
BH CV ECHO MEAS - AVA(I,D): 2 CM^2
BH CV ECHO MEAS - AVA(V,A): 1.9 CM^2
BH CV ECHO MEAS - AVA(V,D): 1.9 CM^2
BH CV ECHO MEAS - BSA(HAYCOCK): 1.7 M^2
BH CV ECHO MEAS - BSA: 1.7 M^2
BH CV ECHO MEAS - BZI_BMI: 22.3 KILOGRAMS/M^2
BH CV ECHO MEAS - BZI_METRIC_HEIGHT: 167.6 CM
BH CV ECHO MEAS - BZI_METRIC_WEIGHT: 62.6 KG
BH CV ECHO MEAS - EDV(CUBED): 64 ML
BH CV ECHO MEAS - EDV(MOD-SP2): 49 ML
BH CV ECHO MEAS - EDV(MOD-SP4): 43 ML
BH CV ECHO MEAS - EDV(TEICH): 70 ML
BH CV ECHO MEAS - EF(CUBED): 72.5 %
BH CV ECHO MEAS - EF(MOD-BP): 63.9 %
BH CV ECHO MEAS - EF(MOD-SP2): 65.3 %
BH CV ECHO MEAS - EF(MOD-SP4): 62.8 %
BH CV ECHO MEAS - EF(TEICH): 64.8 %
BH CV ECHO MEAS - ESV(CUBED): 17.6 ML
BH CV ECHO MEAS - ESV(MOD-SP2): 17 ML
BH CV ECHO MEAS - ESV(MOD-SP4): 16 ML
BH CV ECHO MEAS - ESV(TEICH): 24.6 ML
BH CV ECHO MEAS - FS: 35 %
BH CV ECHO MEAS - IVS/LVPW: 1
BH CV ECHO MEAS - IVSD: 0.6 CM
BH CV ECHO MEAS - LAT PEAK E' VEL: 9.5 CM/SEC
BH CV ECHO MEAS - LV DIASTOLIC VOL/BSA (35-75): 25.2 ML/M^2
BH CV ECHO MEAS - LV MASS(C)D: 64.3 GRAMS
BH CV ECHO MEAS - LV MASS(C)DI: 37.7 GRAMS/M^2
BH CV ECHO MEAS - LV MAX PG: 2.5 MMHG
BH CV ECHO MEAS - LV MEAN PG: 1 MMHG
BH CV ECHO MEAS - LV SYSTOLIC VOL/BSA (12-30): 9.4 ML/M^2
BH CV ECHO MEAS - LV V1 MAX: 79.6 CM/SEC
BH CV ECHO MEAS - LV V1 MEAN: 52.7 CM/SEC
BH CV ECHO MEAS - LV V1 VTI: 16.9 CM
BH CV ECHO MEAS - LVIDD: 4 CM
BH CV ECHO MEAS - LVIDS: 2.6 CM
BH CV ECHO MEAS - LVLD AP2: 7.2 CM
BH CV ECHO MEAS - LVLD AP4: 7.4 CM
BH CV ECHO MEAS - LVLS AP2: 6 CM
BH CV ECHO MEAS - LVLS AP4: 5.9 CM
BH CV ECHO MEAS - LVOT AREA (M): 2.8 CM^2
BH CV ECHO MEAS - LVOT AREA: 2.8 CM^2
BH CV ECHO MEAS - LVOT DIAM: 1.9 CM
BH CV ECHO MEAS - LVPWD: 0.6 CM
BH CV ECHO MEAS - MED PEAK E' VEL: 12.8 CM/SEC
BH CV ECHO MEAS - MV A DUR: 0.12 SEC
BH CV ECHO MEAS - MV A MAX VEL: 68.6 CM/SEC
BH CV ECHO MEAS - MV DEC SLOPE: 366 CM/SEC^2
BH CV ECHO MEAS - MV DEC TIME: 201 SEC
BH CV ECHO MEAS - MV E MAX VEL: 58.3 CM/SEC
BH CV ECHO MEAS - MV E/A: 0.85
BH CV ECHO MEAS - MV MAX PG: 2.4 MMHG
BH CV ECHO MEAS - MV MEAN PG: 1 MMHG
BH CV ECHO MEAS - MV P1/2T MAX VEL: 76 CM/SEC
BH CV ECHO MEAS - MV P1/2T: 60.8 MSEC
BH CV ECHO MEAS - MV V2 MAX: 77.6 CM/SEC
BH CV ECHO MEAS - MV V2 MEAN: 37.2 CM/SEC
BH CV ECHO MEAS - MV V2 VTI: 26.3 CM
BH CV ECHO MEAS - MVA P1/2T LCG: 2.9 CM^2
BH CV ECHO MEAS - MVA(P1/2T): 3.6 CM^2
BH CV ECHO MEAS - MVA(VTI): 1.8 CM^2
BH CV ECHO MEAS - RAP SYSTOLE: 8 MMHG
BH CV ECHO MEAS - SI(AO): 80.1 ML/M^2
BH CV ECHO MEAS - SI(CUBED): 27.2 ML/M^2
BH CV ECHO MEAS - SI(LVOT): 28 ML/M^2
BH CV ECHO MEAS - SI(MOD-SP2): 18.7 ML/M^2
BH CV ECHO MEAS - SI(MOD-SP4): 15.8 ML/M^2
BH CV ECHO MEAS - SI(TEICH): 26.6 ML/M^2
BH CV ECHO MEAS - SV(AO): 136.8 ML
BH CV ECHO MEAS - SV(CUBED): 46.4 ML
BH CV ECHO MEAS - SV(LVOT): 47.9 ML
BH CV ECHO MEAS - SV(MOD-SP2): 32 ML
BH CV ECHO MEAS - SV(MOD-SP4): 27 ML
BH CV ECHO MEAS - SV(TEICH): 45.4 ML
BH CV ECHO MEAS - TAPSE (>1.6): 2.2 CM
BH CV ECHO MEASUREMENTS AVERAGE E/E' RATIO: 5.23
BH CV XLRA - RV BASE: 2.4 CM
BH CV XLRA - TDI S': 14.8 CM/SEC
EOSINOPHIL # BLD AUTO: 0.06 10*3/MM3 (ref 0–0.4)
EOSINOPHIL NFR BLD AUTO: 1.1 % (ref 0.3–6.2)
ERYTHROCYTE [DISTWIDTH] IN BLOOD BY AUTOMATED COUNT: 12.7 % (ref 12.3–15.4)
HCT VFR BLD AUTO: 42.9 % (ref 34–46.6)
HCYS SERPL-SCNC: 8.5 UMOL/L (ref 0–14.5)
HGB BLD-MCNC: 13.8 G/DL (ref 12–15.9)
IMM GRANULOCYTES # BLD AUTO: 0.01 10*3/MM3 (ref 0–0.05)
IMM GRANULOCYTES NFR BLD AUTO: 0.2 % (ref 0–0.5)
LEFT ATRIUM VOLUME INDEX: 15 ML/M2
LV EF 2D ECHO EST: 64 %
LYMPHOCYTES # BLD AUTO: 1.85 10*3/MM3 (ref 0.7–3.1)
LYMPHOCYTES NFR BLD AUTO: 34.5 % (ref 19.6–45.3)
MAXIMAL PREDICTED HEART RATE: 170 BPM
MCH RBC QN AUTO: 24.9 PG (ref 26.6–33)
MCHC RBC AUTO-ENTMCNC: 32.2 G/DL (ref 31.5–35.7)
MCV RBC AUTO: 77.4 FL (ref 79–97)
MONOCYTES # BLD AUTO: 0.64 10*3/MM3 (ref 0.1–0.9)
MONOCYTES NFR BLD AUTO: 11.9 % (ref 5–12)
NEUTROPHILS # BLD AUTO: 2.77 10*3/MM3 (ref 1.7–7)
NEUTROPHILS NFR BLD AUTO: 51.6 % (ref 42.7–76)
NRBC BLD AUTO-RTO: 0 /100 WBC (ref 0–0.2)
PLATELET # BLD AUTO: 246 10*3/MM3 (ref 140–450)
RBC # BLD AUTO: 5.54 10*6/MM3 (ref 3.77–5.28)
STRESS TARGET HR: 145 BPM
WBC # BLD AUTO: 5.37 10*3/MM3 (ref 3.4–10.8)

## 2021-10-08 PROCEDURE — 93306 TTE W/DOPPLER COMPLETE: CPT | Performed by: INTERNAL MEDICINE

## 2021-10-08 PROCEDURE — 93306 TTE W/DOPPLER COMPLETE: CPT

## 2021-10-15 ENCOUNTER — TELEPHONE (OUTPATIENT)
Dept: CARDIOLOGY | Facility: CLINIC | Age: 51
End: 2021-10-15

## 2021-10-15 NOTE — TELEPHONE ENCOUNTER
10/15/21-Dr Philomena Hines  Pt: Darlene Arellano  : 1970  P: [phone]  Reason for Call:  Pt. Would like for you to ask Dr Quach if she really needs to wear the zio patch since she is having a stress testT I told her I would ask you to ask Dr Quach.

## 2021-11-18 ENCOUNTER — TELEMEDICINE (OUTPATIENT)
Dept: CARDIOLOGY | Facility: CLINIC | Age: 51
End: 2021-11-18

## 2021-11-18 ENCOUNTER — TELEPHONE (OUTPATIENT)
Dept: CARDIOLOGY | Facility: CLINIC | Age: 51
End: 2021-11-18

## 2021-11-18 VITALS — HEIGHT: 66 IN | BODY MASS INDEX: 22.18 KG/M2 | WEIGHT: 138 LBS

## 2021-11-18 DIAGNOSIS — R07.89 CHEST DISCOMFORT: ICD-10-CM

## 2021-11-18 DIAGNOSIS — R93.1 AGATSTON CORONARY ARTERY CALCIUM SCORE LESS THAN 100: Primary | Chronic | ICD-10-CM

## 2021-11-18 DIAGNOSIS — R00.2 PALPITATIONS: ICD-10-CM

## 2021-11-18 PROCEDURE — 99443 PR PHYS/QHP TELEPHONE EVALUATION 21-30 MIN: CPT | Performed by: INTERNAL MEDICINE

## 2021-11-18 NOTE — TELEPHONE ENCOUNTER
"I wanted to pass on the information to you.       \"Patient Patient would like to know if you could please give her a call back? States she forgot to tell Dr. Hines in the past she has had a burning sensation that radiates to arm and shoulder. Tel.# [phone]\"      "

## 2021-11-18 NOTE — PROGRESS NOTES
Chief Complaint  Results (Echo, Stress test not performed due to cost/insurance), Dizziness (lightheaded yesterday after running), and Shortness of Breath (yesterday after walking, felt palpitations)    Subjective    History of Present Illness      You have chosen to receive care through a telephone visit. Do you consent to use a telephone visit for your medical care today? Yes    I had a telephone visit with Ms. Arellano today.  She is a 51-year-old female with a positive family history of premature cardiac disease, dyslipidemia, prediabetes and positive MTHFR gene.  She had a stress echo in September 2019 which was normal.  She had a coronary calcium scan with a total score of 50.    She had a telemedicine visit with Dr. Dustin Hines in September 2021.  She was complaining of lightheadedness and palpitations.  An ambulatory monitor was ordered however she was unable to follow through with the study.  Since her previous visit she has had an echocardiogram which revealed normal left ventricular function.  She had mild MR and TR.  She states that she has been very anxious over the results of the echocardiogram and has not exercised.  We did discuss the results in the office today.  She only has mild MR and TR with normal left ventricular function.  This should not inhibit any activities.    She she did run yesterday in anticipation of the visit so she could see how she felt.  She states that she was somewhat out of shape since she had not been running a a while.  She was able to run 1/2 mile without difficulty.  After her run she did have some lightheadedness for approximately an hour and a half and then felt better.  She has intermittent episodes of shortness of air in association with a small fluttering sensation of a few seconds duration.  The episodes occur a couple of times a month.  She also has some atypical chest pain which occurs with no particular pattern.  She describes this as a sharp burning type  "discomfort.    Objective   Vital Signs:   Ht 167.6 cm (66\")   Wt 62.6 kg (138 lb)   BMI 22.27 kg/m²     Physical Exam   Unable to perform physical exam secondary to telephone visit  Result Review :   The following data was reviewed by: Philomena Hines MD on 11/18/2021:  CMP    CMP 2/3/21 8/9/21 8/9/21 9/28/21     1313 1313    Glucose   94    Glucose 90      BUN 10  12    Creatinine 0.7  0.87    eGFR Non  Am   69    eGFR African Am   84    Sodium 138  142    Potassium 3.9  4.7    Chloride 102  104    Calcium 9.4  10.4    Total Protein  7.6  7.3   Albumin 4.5 4.60  4.80   Total Bilirubin 0.5 0.4  0.5   Alkaline Phosphatase 96 134 (A)  123 (A)   AST (SGOT) 22 40 (A)  24   ALT (SGPT) 18 60 (A)  35 (A)   (A) Abnormal value       Comments are available for some flowsheets but are not being displayed.           CBC w/diff    CBC w/Diff 2/3/21 8/9/21 10/7/21   WBC 4.54 3.99 5.37   RBC 5.16 5.73 (A) 5.54 (A)   Hemoglobin 13.0 14.1 13.8   Hematocrit 40.5 44.2 42.9   MCV 78.5 (A) 77.1 (A) 77.4 (A)   MCH 25.2 (A) 24.6 (A) 24.9 (A)   MCHC 32.1 31.9 32.2   RDW 14.1 12.9 12.7   Platelets 252 239 246   Neutrophil Rel % 38.8 (A) 47.3 51.6   Immature Granulocyte Rel % 0.2     Lymphocyte Rel % 50.4 (A) 41.9 34.5   Monocyte Rel % 8.6 8.5 11.9   Eosinophil Rel % 0.9 1.5 1.1   Basophil Rel % 1.1 0.8 0.7   (A) Abnormal value            Lipid Panel    Lipid Panel 9/28/21   Total Cholesterol 150   Triglycerides 48   HDL Cholesterol 66 (A)   VLDL Cholesterol 11   LDL Cholesterol  73   (A) Abnormal value       Comments are available for some flowsheets but are not being displayed.           TSH    TSH 2/3/21 8/12/21   TSH 0.468 0.814                     Assessment and Plan    1. Agatston coronary artery calcium score less than 100  We did rediscuss her coronary calcium score in 2020.  Her score was 50, this is relatively low.  She does have a positive family history of premature coronary disease.  She does not have symptoms of " angina.  She had a normal stress echo in 2019.  I told her it would be reasonable to repeat her coronary calcium score late next year.    2. Chest discomfort  Her chest discomfort is atypical for myocardial ischemia.  I do not think that she warrants nuclear stress testing unless she begins to have chest discomfort with exercise    3. Palpitations  She has palpitations but they are relatively infrequent and asymptomatic other than some mild shortness of breath during the palpitations.    Overall, Ms. Arellano is stable from a cardiac standpoint.  I have tried to reassure her regarding her cardiac status.  I feel like she can exercise as much as she likes..  She will continue to follow a healthy heart diet.    Length of telephone visit was 21 minutes        Follow Up   No follow-ups on file.  Patient was given instructions and counseling regarding her condition or for health maintenance advice. Please see specific information pulled into the AVS if appropriate.

## 2021-12-08 ENCOUNTER — APPOINTMENT (OUTPATIENT)
Dept: VACCINE CLINIC | Facility: HOSPITAL | Age: 51
End: 2021-12-08

## 2021-12-10 ENCOUNTER — IMMUNIZATION (OUTPATIENT)
Dept: VACCINE CLINIC | Facility: HOSPITAL | Age: 51
End: 2021-12-10

## 2021-12-10 PROCEDURE — 91300 HC SARSCOV02 VAC 30MCG/0.3ML IM: CPT | Performed by: INTERNAL MEDICINE

## 2021-12-10 PROCEDURE — 0004A HC ADM SARSCOV2 30MCG/0.3ML BOOSTER: CPT | Performed by: INTERNAL MEDICINE

## 2022-02-02 ENCOUNTER — OFFICE VISIT (OUTPATIENT)
Dept: INTERNAL MEDICINE | Facility: CLINIC | Age: 52
End: 2022-02-02

## 2022-02-02 VITALS
TEMPERATURE: 96.9 F | RESPIRATION RATE: 14 BRPM | WEIGHT: 148 LBS | BODY MASS INDEX: 23.89 KG/M2 | HEART RATE: 80 BPM | DIASTOLIC BLOOD PRESSURE: 60 MMHG | SYSTOLIC BLOOD PRESSURE: 102 MMHG

## 2022-02-02 DIAGNOSIS — F41.9 ANXIETY: Chronic | ICD-10-CM

## 2022-02-02 DIAGNOSIS — R42 LIGHTHEADEDNESS: ICD-10-CM

## 2022-02-02 DIAGNOSIS — E78.5 DYSLIPIDEMIA: Chronic | ICD-10-CM

## 2022-02-02 DIAGNOSIS — R23.1 LIVEDO RETICULARIS: Primary | ICD-10-CM

## 2022-02-02 DIAGNOSIS — R73.03 PREDIABETES: Chronic | ICD-10-CM

## 2022-02-02 PROCEDURE — 99214 OFFICE O/P EST MOD 30 MIN: CPT | Performed by: NURSE PRACTITIONER

## 2022-02-03 DIAGNOSIS — I25.10 CORONARY ARTERY DISEASE INVOLVING NATIVE CORONARY ARTERY OF NATIVE HEART WITHOUT ANGINA PECTORIS: Chronic | ICD-10-CM

## 2022-02-03 DIAGNOSIS — E78.5 DYSLIPIDEMIA: Chronic | ICD-10-CM

## 2022-02-03 LAB
ALBUMIN SERPL-MCNC: 4.8 G/DL (ref 3.8–4.9)
ALBUMIN/GLOB SERPL: 1.8 {RATIO} (ref 1.2–2.2)
ALP SERPL-CCNC: 125 IU/L (ref 44–121)
ALT SERPL-CCNC: 43 IU/L (ref 0–32)
AST SERPL-CCNC: 39 IU/L (ref 0–40)
BILIRUB SERPL-MCNC: 0.5 MG/DL (ref 0–1.2)
BUN SERPL-MCNC: 14 MG/DL (ref 6–24)
BUN/CREAT SERPL: 16 (ref 9–23)
CALCIUM SERPL-MCNC: 10 MG/DL (ref 8.7–10.2)
CHLORIDE SERPL-SCNC: 103 MMOL/L (ref 96–106)
CHOLEST SERPL-MCNC: 153 MG/DL (ref 100–199)
CHOLEST/HDLC SERPL: 2.2 RATIO (ref 0–4.4)
CO2 SERPL-SCNC: 23 MMOL/L (ref 20–29)
CREAT SERPL-MCNC: 0.87 MG/DL (ref 0.57–1)
GLOBULIN SER CALC-MCNC: 2.6 G/DL (ref 1.5–4.5)
GLUCOSE SERPL-MCNC: 105 MG/DL (ref 65–99)
HBA1C MFR BLD: 6 % (ref 4.8–5.6)
HDLC SERPL-MCNC: 71 MG/DL
LDLC SERPL CALC-MCNC: 71 MG/DL (ref 0–99)
POTASSIUM SERPL-SCNC: 4.4 MMOL/L (ref 3.5–5.2)
PROT SERPL-MCNC: 7.4 G/DL (ref 6–8.5)
SODIUM SERPL-SCNC: 141 MMOL/L (ref 134–144)
TRIGL SERPL-MCNC: 53 MG/DL (ref 0–149)
VLDLC SERPL CALC-MCNC: 11 MG/DL (ref 5–40)

## 2022-02-03 RX ORDER — FOLIC ACID 1 MG/1
1 TABLET ORAL DAILY
Qty: 90 TABLET | Refills: 3 | Status: SHIPPED | OUTPATIENT
Start: 2022-02-03

## 2022-02-03 RX ORDER — ROSUVASTATIN CALCIUM 5 MG/1
5 TABLET, COATED ORAL DAILY
Qty: 90 TABLET | Refills: 3 | Status: SHIPPED | OUTPATIENT
Start: 2022-02-03 | End: 2022-02-09

## 2022-02-08 DIAGNOSIS — E78.5 DYSLIPIDEMIA: Chronic | ICD-10-CM

## 2022-02-08 DIAGNOSIS — I25.10 CORONARY ARTERY DISEASE INVOLVING NATIVE CORONARY ARTERY OF NATIVE HEART WITHOUT ANGINA PECTORIS: Chronic | ICD-10-CM

## 2022-02-08 NOTE — TELEPHONE ENCOUNTER
Dr. Malcolm Garcia does not accept patient's insurance it is out of network, please advise? [phone]

## 2022-02-09 RX ORDER — ROSUVASTATIN CALCIUM 5 MG/1
TABLET, COATED ORAL
Qty: 90 TABLET | Refills: 3 | Status: SHIPPED | OUTPATIENT
Start: 2022-02-09 | End: 2022-11-28 | Stop reason: SDUPTHER

## 2022-02-14 ENCOUNTER — TELEPHONE (OUTPATIENT)
Dept: INTERNAL MEDICINE | Facility: CLINIC | Age: 52
End: 2022-02-14

## 2022-02-14 NOTE — TELEPHONE ENCOUNTER
Patient is wanting to get the side effects of Paxil, and is wanting someone to call her back, please call [phone].

## 2022-02-14 NOTE — TELEPHONE ENCOUNTER
Side effects of Paxil are similar to those of sertraline and Lexapro; more weight gain with Paxil. Any additional questions can be answered at visit she scheduled on Thursday.

## 2022-02-14 NOTE — TELEPHONE ENCOUNTER
This message was sent before pt was put on your schedule for Thursday. I will complete this since she is coming in to discuss.

## 2022-02-15 RX ORDER — FOLIC ACID 0.8 MG
500 TABLET ORAL DAILY
COMMUNITY
Start: 2022-01-31 | End: 2022-08-04

## 2022-02-15 NOTE — PROGRESS NOTES
Subjective   Chief Complaint   Patient presents with   • Anxiety       History of Present Illness   52 y.o. female presents for follow up regarding anxiety; specifically wishes to discus Paxil. Previously have discussed sertraline, Lexapro and Effexor and even sent in prescriptions for Sertraline 25 mg daily and Effexor 37.5 mg daily but hse has been reticent to start anything. 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 Relocated to [place] from [place]. She would like to start Brisdelle as she feel it would best help with her anxiety, depression and PMDD.    She also reports a sore spot that is better today between her gluts that comes and goes. Never comes to a head. No fever or chills. Uncertain about redness but has never had drainage and it is never warm to touch. First popped up after having the flu in 2018. She has a H/O herpes. GYN has never seen it.      Patient Active Problem List   Diagnosis   • Agatston coronary artery calcium score less than 100   • Dyslipidemia   • MTHFR mutation   • Hypercoagulable state (HCC)   • History of stress test   • Chest discomfort   • Elevated LFTs   • Palpitations   • PMDD (premenstrual dysphoric disorder)   • Prediabetes   • Sickle-cell trait (HCC)   • Lung nodule seen on imaging study   • Sinusitis chronic, ethmoidal   • Livedo reticularis   • Autoimmune liver disease   • Lightheadedness   • Anxiety       No Known Allergies    Current Outpatient Medications on File Prior to Visit   Medication Sig Dispense Refill   • ascorbic acid (VITAMIN C) 250 MG chewable tablet chewable tablet Chew.     • aspirin (Aspirin Adult Low Dose) 81 MG EC tablet Take 1 tablet by mouth Daily.     • cholecalciferol (Cholecalciferol) 25 MCG (1000 UT) tablet Take 2,000 Units by mouth Daily.     • folic acid (FOLVITE) 1 MG tablet Take 1 tablet by mouth Daily. 90 tablet 3   • Iron, Ferrous Sulfate, 325 (65 Fe) MG tablet Take 1 tablet by mouth Daily.     •  "rosuvastatin (CRESTOR) 5 MG tablet TAKE 1 TABLET BY MOUTH EVERY DAY 90 tablet 3   • vitamin B-12 (CYANOCOBALAMIN) 1000 MCG tablet Take 1,000 mcg by mouth Daily.     • Magnesium 500 MG capsule Take 500 mg by mouth Daily.       No current facility-administered medications on file prior to visit.       Past Medical History:   Diagnosis Date   • CAD (coronary artery disease)        Family History   Problem Relation Age of Onset   • Diabetes Mother    • Heart attack Father    • Heart disease Father    • Diabetes Sister    • Heart disease Brother    • Heart disease Maternal Grandmother    • Heart attack Maternal Grandmother        Social History     Socioeconomic History   • Marital status: Single   Tobacco Use   • Smoking status: Never Smoker   • Smokeless tobacco: Never Used   Vaping Use   • Vaping Use: Never used   Substance and Sexual Activity   • Alcohol use: Yes     Alcohol/week: 1.0 standard drink     Types: 1 Glasses of wine per week   • Drug use: Never   • Sexual activity: Defer       Past Surgical History:   Procedure Laterality Date   • MYOMECTOMY  2008       The following portions of the patient's history were reviewed and updated as appropriate: problem list, allergies, current medications, past medical history and past social history.    Review of Systems    Immunization History   Administered Date(s) Administered   • COVID-19 (PFIZER) PURPLE CAP 04/08/2021, 04/29/2021, 12/10/2021       Objective   Vitals:    02/17/22 1113   Temp: 97.5 °F (36.4 °C)   Weight: 64.9 kg (143 lb)   Height: 167.6 cm (66\")     Body mass index is 23.08 kg/m².  Physical Exam  Constitutional:       Appearance: Normal appearance.   HENT:      Head: Normocephalic and atraumatic.   Skin:     General: Skin is warm and dry.      Comments: Tailbone at top of gluteal cleft TTP. No redness, drainage or heat.    Neurological:      Mental Status: She is alert.   Psychiatric:         Mood and Affect: Mood normal.         Behavior: Behavior " normal.         Procedures    Assessment/Plan   Diagnoses and all orders for this visit:    1. Anxiety (Primary)  Comments:  Start Paroxetine 20 mg daily; side effects reviewed. Discussed support group given caregiver role strain. Continue with counselor.   Orders:  -     PARoxetine (PAXIL) 20 MG tablet; Take 1 tablet by mouth Every Morning.  Dispense: 90 tablet; Refill: 0    2. Cyst near tailbone  Comments:  Area is TTP but nothing is palpable. Wonder if perhaps what she reports is a pilonidal cyst.     Records reviewed include previous OVs with myself.     Return in about 8 weeks (around 4/14/2022).

## 2022-02-17 ENCOUNTER — OFFICE VISIT (OUTPATIENT)
Dept: INTERNAL MEDICINE | Facility: CLINIC | Age: 52
End: 2022-02-17

## 2022-02-17 VITALS — WEIGHT: 143 LBS | HEIGHT: 66 IN | TEMPERATURE: 97.5 F | BODY MASS INDEX: 22.98 KG/M2

## 2022-02-17 DIAGNOSIS — F41.9 ANXIETY: Primary | Chronic | ICD-10-CM

## 2022-02-17 DIAGNOSIS — L05.91 CYST NEAR TAILBONE: ICD-10-CM

## 2022-02-17 PROCEDURE — 99213 OFFICE O/P EST LOW 20 MIN: CPT | Performed by: NURSE PRACTITIONER

## 2022-02-17 RX ORDER — PAROXETINE HYDROCHLORIDE 20 MG/1
20 TABLET, FILM COATED ORAL EVERY MORNING
Qty: 90 TABLET | Refills: 0 | Status: SHIPPED | OUTPATIENT
Start: 2022-02-17 | End: 2022-05-18

## 2022-03-15 NOTE — PROGRESS NOTES
"Chief Complaint  Coronary Artery Disease    Subjective    History of Present Illness      I saw Ms. Arellano in the office.  She is a 51-year-old female with a positive family history of premature cardiac disease, dyslipidemia, prediabetes and positive MTHFR gene.  She had a stress echo in September 2019 which was normal.  She had a coronary calcium scan with a total score of 50. She had transthoracic echo in October 2021 which demonstrated normal ejection fraction with normal diastolic function.  She had mild mitral and tricuspid insufficiency.    Darlene states that she continues to have periodic episodes of chest discomfort.  They are somewhat worrisome to her due to positive family history of premature coronary disease, prediabetes and dyslipidemia.  She does have an occasional palpitation.  She states it occurs once every couple months.  It does not cause dizziness or near syncope.  She does have dizziness but is sounds orthostatic in nature.  We did review her most recent lab work.    Objective   Vital Signs:   BP 92/50   Pulse 69   Ht 167.6 cm (66\")   Wt 64.8 kg (142 lb 12.8 oz)   BMI 23.05 kg/m²     Vitals and nursing note reviewed.   Constitutional:       General: Not in acute distress.     Appearance: Healthy appearance. Well-developed and not in distress. Not diaphoretic.   Eyes:      General: No scleral icterus.     Conjunctiva/sclera: Conjunctivae normal.      Pupils: Pupils are equal, round, and reactive to light.   HENT:      Head: Normocephalic and atraumatic.   Neck:      Thyroid: Thyroid normal. No thyromegaly.      Vascular: JVD normal.      Lymphadenopathy: No cervical adenopathy.   Pulmonary:      Effort: Pulmonary effort is normal. No respiratory distress.      Breath sounds: Normal breath sounds. No wheezing. No rales.   Cardiovascular:      PMI at left midclavicular line. Normal rate. Regular rhythm. Normal S1. Normal S2.      Murmurs: There is no murmur.      No gallop. No S3 and S4 gallop. " No click. No rub.   Pulses:     Intact distal pulses. No decreased pulses.   Edema:     Peripheral edema absent.   Abdominal:      General: Bowel sounds are normal. There is no distension or abdominal bruit.      Palpations: Abdomen is soft. There is no hepatomegaly or abdominal mass.      Tenderness: There is no abdominal tenderness. There is no guarding or rebound.   Musculoskeletal: Normal range of motion.      Cervical back: Normal range of motion and neck supple. Skin:     General: Skin is warm and dry.      Coloration: Skin is not pale.      Findings: No rash.   Neurological:      Mental Status: Alert, oriented to person, place, and time and oriented to person, place and time.      Coordination: Coordination normal.      Gait: Gait is intact.   Psychiatric:         Behavior: Behavior normal.         Result Review :   The following data was reviewed by: Philomena Hines MD on 03/16/2022:  CMP    CMP 8/9/21 8/9/21 9/28/21 2/2/22    1313 1313     Glucose  94  105 (A)   BUN  12  14   Creatinine  0.87  0.87   eGFR Non  Am  69  77   eGFR African Am  84  89   Sodium  142  141   Potassium  4.7  4.4   Chloride  104  103   Calcium  10.4  10.0   Total Protein 7.6  7.3 7.4   Albumin 4.60  4.80 4.8   Globulin    2.6   Total Bilirubin 0.4  0.5 0.5   Alkaline Phosphatase 134 (A)  123 (A) 125 (A)   AST (SGOT) 40 (A)  24 39   ALT (SGPT) 60 (A)  35 (A) 43 (A)   (A) Abnormal value       Comments are available for some flowsheets but are not being displayed.           Lipid Panel    Lipid Panel 9/28/21 2/2/22   Total Cholesterol 150 153   Triglycerides 48 53   HDL Cholesterol 66 (A) 71   VLDL Cholesterol 11 11   LDL Cholesterol  73 71   (A) Abnormal value       Comments are available for some flowsheets but are not being displayed.           TSH    TSH 8/12/21   TSH 0.814                     Assessment and Plan    1. Chest discomfort  Ms. Arellano continues to have intermittent chest discomfort.  She does have prediabetes,  dyslipidemia and a positive family history of premature cardiac disease.  She is under a great deal of stress.  This may be contributing to her discomfort.  I would like to obtain a nuclear stress test for further evaluation of perfusion of her myocardium.    2. Palpitations  Her palpitations are intermittent and for the most part asymptomatic    3. Dyslipidemia  She is on rosuvastatin 5 mg daily.  Her most recent LDL was 71.  Would leave her at this dose due to her elevated liver enzymes.    4. Autoimmune liver disease  Her AST and ALT remain elevated.  Currently being monitored.        Follow Up   No follow-ups on file.  Patient was given instructions and counseling regarding her condition or for health maintenance advice. Please see specific information pulled into the AVS if appropriate.

## 2022-03-16 ENCOUNTER — OFFICE VISIT (OUTPATIENT)
Dept: CARDIOLOGY | Facility: CLINIC | Age: 52
End: 2022-03-16

## 2022-03-16 VITALS
HEIGHT: 66 IN | BODY MASS INDEX: 22.95 KG/M2 | SYSTOLIC BLOOD PRESSURE: 92 MMHG | HEART RATE: 69 BPM | DIASTOLIC BLOOD PRESSURE: 50 MMHG | WEIGHT: 142.8 LBS

## 2022-03-16 DIAGNOSIS — R07.89 CHEST DISCOMFORT: Primary | ICD-10-CM

## 2022-03-16 DIAGNOSIS — R00.2 PALPITATIONS: ICD-10-CM

## 2022-03-16 DIAGNOSIS — K76.89 AUTOIMMUNE LIVER DISEASE: ICD-10-CM

## 2022-03-16 DIAGNOSIS — E78.5 DYSLIPIDEMIA: Chronic | ICD-10-CM

## 2022-03-16 PROCEDURE — 99214 OFFICE O/P EST MOD 30 MIN: CPT | Performed by: INTERNAL MEDICINE

## 2022-04-14 PROBLEM — H81.10 BENIGN PAROXYSMAL POSITIONAL VERTIGO: Status: ACTIVE | Noted: 2022-01-31

## 2022-04-14 PROBLEM — F41.9 ANXIETY: Chronic | Status: ACTIVE | Noted: 2021-10-06

## 2022-04-14 NOTE — PROGRESS NOTES
Subjective   Chief Complaint   Patient presents with   • Headache       History of Present Illness   52 y.o. female presents for follow up regarding anxiety for which she was started on Paroxetine; it has helped 60-70% and does not feel like it needs dose adjusted.     Also continues to have HAs. Evaluated by Delroes WEAVER with [place] [facility]. Tension HA suspected. MRI brain and MRI IAC advised and following up with neuro 5/3/22. Imaging not yet done.      Chest pain continues. She is followed by Dr. Philomena Hines with recent follow up in March. Stress suspected to be playing a role. Nuclear stress test scheduled 4/19/2022.     Requesting dental referral.      Patient Active Problem List   Diagnosis   • Agatston coronary artery calcium score less than 100   • Dyslipidemia   • MTHFR mutation   • Hypercoagulable state (HCC)   • History of stress test   • Chest discomfort   • Elevated LFTs   • Palpitations   • PMDD (premenstrual dysphoric disorder)   • Prediabetes   • Sickle-cell trait (HCC)   • Lung nodule seen on imaging study   • Sinusitis chronic, ethmoidal   • Livedo reticularis   • Autoimmune liver disease   • Lightheadedness   • Anxiety   • Benign paroxysmal positional vertigo       No Known Allergies    Current Outpatient Medications on File Prior to Visit   Medication Sig Dispense Refill   • ascorbic acid (VITAMIN C) 250 MG chewable tablet chewable tablet Chew.     • cholecalciferol (VITAMIN D3) 25 MCG (1000 UT) tablet Take 2,000 Units by mouth Daily.     • folic acid (FOLVITE) 1 MG tablet Take 1 tablet by mouth Daily. 90 tablet 3   • Iron, Ferrous Sulfate, 325 (65 Fe) MG tablet Take 1 tablet by mouth Daily.     • PARoxetine (PAXIL) 20 MG tablet Take 1 tablet by mouth Every Morning. 90 tablet 0   • rosuvastatin (CRESTOR) 5 MG tablet TAKE 1 TABLET BY MOUTH EVERY DAY 90 tablet 3   • aspirin 81 MG EC tablet Take 1 tablet by mouth Daily.     • Magnesium 500 MG capsule Take 500 mg by mouth Daily.    "    No current facility-administered medications on file prior to visit.       Past Medical History:   Diagnosis Date   • CAD (coronary artery disease)        Family History   Problem Relation Age of Onset   • Diabetes Mother    • Heart attack Father    • Heart disease Father    • Diabetes Sister    • Heart disease Brother    • Heart disease Maternal Grandmother    • Heart attack Maternal Grandmother        Social History     Socioeconomic History   • Marital status: Single   Tobacco Use   • Smoking status: Never Smoker   • Smokeless tobacco: Never Used   Vaping Use   • Vaping Use: Never used   Substance and Sexual Activity   • Alcohol use: Yes     Alcohol/week: 1.0 standard drink     Types: 1 Glasses of wine per week   • Drug use: Never   • Sexual activity: Defer       Past Surgical History:   Procedure Laterality Date   • MYOMECTOMY  2008       The following portions of the patient's history were reviewed and updated as appropriate: problem list, allergies, current medications, past medical history and past social history.    Review of Systems    Immunization History   Administered Date(s) Administered   • COVID-19 (PFIZER) PURPLE CAP 04/08/2021, 04/29/2021, 12/10/2021       Objective   Vitals:    04/15/22 1435   Temp: 97.1 °F (36.2 °C)   Weight: 63 kg (139 lb)   Height: 167.6 cm (66\")     Body mass index is 22.44 kg/m².  Physical Exam  Vitals reviewed.   Constitutional:       Appearance: Normal appearance. She is well-developed.   HENT:      Head: Normocephalic and atraumatic.   Cardiovascular:      Rate and Rhythm: Normal rate and regular rhythm.      Heart sounds: Normal heart sounds, S1 normal and S2 normal.   Pulmonary:      Effort: Pulmonary effort is normal.      Breath sounds: Normal breath sounds.   Chest:   Breasts:      Right: Normal. No axillary adenopathy.      Left: Normal. No axillary adenopathy.         Lymphadenopathy:      Upper Body:      Right upper body: No axillary adenopathy.      Left upper " body: No axillary adenopathy.   Skin:     General: Skin is warm and dry.   Neurological:      Mental Status: She is alert.   Psychiatric:         Mood and Affect: Mood normal.         Behavior: Behavior normal.         Procedures    Assessment/Plan   Diagnoses and all orders for this visit:    1. Anxiety (Primary)  Comments:  60-70% improved with Paroxetine 20 mg daily which she will continue without dose adjustment at this time.     2. Pain of left breast  Comments:  CBE normal but notes breast pain as noted in PE. Diagnostic MMG with US left breast.   Orders:  -     Mammo Diagnostic Bilateral With CAD  -     US Breast Left Limited    3. Nonintractable episodic headache, unspecified headache type  Comments:  Tension HA suspected by neurology--Delores Cormier with [place] [facility]. Imaging of head pending.     4. Chest discomfort  Comments:  Recent OV with Dr. Philomena Hines. Nuclear stress test scheduled 4/19/2022.     5. Dyslipidemia  -     Lipid Panel With / Chol / HDL Ratio; Future    6. Prediabetes  -     Comprehensive Metabolic Panel; Future  -     Hemoglobin A1c; Future    Records reviewed include previous OV with myself, Dr. Hines, Dr. Shultz, and Delores Cormier APRN neuro as well as labs.     Return in about 6 months (around 10/15/2022) for Lab B4 FUP.

## 2022-04-15 ENCOUNTER — OFFICE VISIT (OUTPATIENT)
Dept: INTERNAL MEDICINE | Facility: CLINIC | Age: 52
End: 2022-04-15

## 2022-04-15 VITALS — HEIGHT: 66 IN | WEIGHT: 139 LBS | TEMPERATURE: 97.1 F | BODY MASS INDEX: 22.34 KG/M2

## 2022-04-15 DIAGNOSIS — E78.5 DYSLIPIDEMIA: Chronic | ICD-10-CM

## 2022-04-15 DIAGNOSIS — R51.9 NONINTRACTABLE EPISODIC HEADACHE, UNSPECIFIED HEADACHE TYPE: ICD-10-CM

## 2022-04-15 DIAGNOSIS — R73.03 PREDIABETES: Chronic | ICD-10-CM

## 2022-04-15 DIAGNOSIS — R07.89 CHEST DISCOMFORT: ICD-10-CM

## 2022-04-15 DIAGNOSIS — F41.9 ANXIETY: Primary | Chronic | ICD-10-CM

## 2022-04-15 DIAGNOSIS — N64.4 PAIN OF LEFT BREAST: ICD-10-CM

## 2022-04-15 PROCEDURE — 99214 OFFICE O/P EST MOD 30 MIN: CPT | Performed by: NURSE PRACTITIONER

## 2022-04-19 ENCOUNTER — APPOINTMENT (OUTPATIENT)
Dept: NUCLEAR MEDICINE | Facility: HOSPITAL | Age: 52
End: 2022-04-19

## 2022-04-21 ENCOUNTER — TELEPHONE (OUTPATIENT)
Dept: INTERNAL MEDICINE | Facility: CLINIC | Age: 52
End: 2022-04-21

## 2022-04-21 NOTE — TELEPHONE ENCOUNTER
PATIENT IS CALLING IN ASKING FOR A CALL BACK.  SHE IS ASKING FOR AN ORDER FOR A LEG ULTRA SOUND,  THAT WAS REQUESTED BY DR KRAMER.      PLEASE ADVISE     [phone]

## 2022-04-29 ENCOUNTER — HOSPITAL ENCOUNTER (OUTPATIENT)
Dept: CARDIOLOGY | Facility: HOSPITAL | Age: 52
End: 2022-04-29

## 2022-05-06 ENCOUNTER — HOSPITAL ENCOUNTER (OUTPATIENT)
Dept: CARDIOLOGY | Facility: HOSPITAL | Age: 52
Discharge: HOME | End: 2022-05-06
Admitting: INTERNAL MEDICINE

## 2022-05-06 DIAGNOSIS — R07.89 CHEST DISCOMFORT: ICD-10-CM

## 2022-05-06 DIAGNOSIS — R00.2 PALPITATIONS: ICD-10-CM

## 2022-05-06 PROCEDURE — 93018 CV STRESS TEST I&R ONLY: CPT | Performed by: INTERNAL MEDICINE

## 2022-05-06 PROCEDURE — 93017 CV STRESS TEST TRACING ONLY: CPT

## 2022-05-07 LAB
BH CV STRESS BP STAGE 1: NORMAL
BH CV STRESS BP STAGE 2: NORMAL
BH CV STRESS BP STAGE 3: NORMAL
BH CV STRESS BP STAGE 4: NORMAL
BH CV STRESS DURATION MIN STAGE 1: 3
BH CV STRESS DURATION MIN STAGE 2: 3
BH CV STRESS DURATION MIN STAGE 3: 3
BH CV STRESS DURATION MIN STAGE 4: 1
BH CV STRESS DURATION SEC STAGE 1: 0
BH CV STRESS DURATION SEC STAGE 2: 0
BH CV STRESS DURATION SEC STAGE 3: 0
BH CV STRESS DURATION SEC STAGE 4: 0
BH CV STRESS GRADE STAGE 1: 10
BH CV STRESS GRADE STAGE 2: 12
BH CV STRESS GRADE STAGE 3: 14
BH CV STRESS GRADE STAGE 4: 16
BH CV STRESS HR STAGE 1: 119
BH CV STRESS HR STAGE 2: 141
BH CV STRESS HR STAGE 3: 160
BH CV STRESS HR STAGE 4: 164
BH CV STRESS METS STAGE 1: 5
BH CV STRESS METS STAGE 2: 7.5
BH CV STRESS METS STAGE 3: 10
BH CV STRESS METS STAGE 4: 13.5
BH CV STRESS PROTOCOL 1: NORMAL
BH CV STRESS RECOVERY BP: NORMAL MMHG
BH CV STRESS RECOVERY HR: 92 BPM
BH CV STRESS SPEED STAGE 1: 1.7
BH CV STRESS SPEED STAGE 2: 2.5
BH CV STRESS SPEED STAGE 3: 3.4
BH CV STRESS SPEED STAGE 4: 4.2
BH CV STRESS STAGE 1: 1
BH CV STRESS STAGE 2: 2
BH CV STRESS STAGE 3: 3
BH CV STRESS STAGE 4: 4
MAXIMAL PREDICTED HEART RATE: 169 BPM
PERCENT MAX PREDICTED HR: 97.04 %
STRESS BASELINE BP: NORMAL MMHG
STRESS BASELINE HR: 59 BPM
STRESS PERCENT HR: 114 %
STRESS POST ESTIMATED WORKLOAD: 11.8 METS
STRESS POST EXERCISE DUR MIN: 10 MIN
STRESS POST EXERCISE DUR SEC: 0 SEC
STRESS POST PEAK BP: NORMAL MMHG
STRESS POST PEAK HR: 164 BPM
STRESS TARGET HR: 144 BPM

## 2022-05-18 ENCOUNTER — HOSPITAL ENCOUNTER (OUTPATIENT)
Dept: ULTRASOUND IMAGING | Facility: HOSPITAL | Age: 52
Discharge: HOME | End: 2022-05-18

## 2022-05-18 ENCOUNTER — HOSPITAL ENCOUNTER (OUTPATIENT)
Dept: MAMMOGRAPHY | Facility: HOSPITAL | Age: 52
Discharge: HOME | End: 2022-05-18

## 2022-05-18 DIAGNOSIS — F41.9 ANXIETY: Chronic | ICD-10-CM

## 2022-05-18 PROCEDURE — 76641 ULTRASOUND BREAST COMPLETE: CPT

## 2022-05-18 PROCEDURE — 77066 DX MAMMO INCL CAD BI: CPT

## 2022-05-18 PROCEDURE — G0279 TOMOSYNTHESIS, MAMMO: HCPCS

## 2022-05-18 RX ORDER — PAROXETINE HYDROCHLORIDE 20 MG/1
TABLET, FILM COATED ORAL
Qty: 90 TABLET | Refills: 0 | Status: SHIPPED | OUTPATIENT
Start: 2022-05-18 | End: 2022-08-25 | Stop reason: SDUPTHER

## 2022-06-10 ENCOUNTER — TELEPHONE (OUTPATIENT)
Dept: INTERNAL MEDICINE | Facility: CLINIC | Age: 52
End: 2022-06-10

## 2022-06-10 NOTE — TELEPHONE ENCOUNTER
Patient is wanting to schedule her follow up ultrasound due in November 2022, ultrasound is for bilateral breast ultrasound, please advise? [phone]

## 2022-06-12 DIAGNOSIS — R92.8 ABNORMAL MAMMOGRAM OF BOTH BREASTS: Primary | ICD-10-CM

## 2022-07-25 ENCOUNTER — TELEPHONE (OUTPATIENT)
Dept: INTERNAL MEDICINE | Facility: CLINIC | Age: 52
End: 2022-07-25

## 2022-07-25 NOTE — TELEPHONE ENCOUNTER
Caller: Darlene Arellano    Relationship to patient: Self    Best call back number: [phone]*    Patient is needing: RESCHEDULE LAB APPOINTMENT. ATTEMPT TO WARM SCHEDULED X2, NO ANSWER.

## 2022-07-26 ENCOUNTER — TELEPHONE (OUTPATIENT)
Dept: INTERNAL MEDICINE | Facility: CLINIC | Age: 52
End: 2022-07-26

## 2022-07-26 NOTE — TELEPHONE ENCOUNTER
Patient needs a bilateral diagnostic mammogram and a bilateral US of the breasts in 6 months.  She would like the orders to be put in the system please so that she can go ahead and get it scheduled.  She would also like it if someone would please call and let her know when these have been put in.  She can be reached at [phone].

## 2022-07-27 DIAGNOSIS — R92.8 ABNORMAL MAMMOGRAM OF BOTH BREASTS: Primary | ICD-10-CM

## 2022-08-02 RX ORDER — ATORVASTATIN CALCIUM 10 MG/1
1 TABLET, FILM COATED ORAL DAILY
COMMUNITY
End: 2022-08-04

## 2022-08-02 NOTE — PROGRESS NOTES
Subjective   Chief Complaint   Patient presents with   • Annual Exam       History of Present Illness   52 y.o. female presents for annual physical. She is running more and has made several dietary changes. Weight is down 3# since March. She has had to reschedule her CLS as she has to find someone to sit with her mother; plans on scheduling follow up with Dr. Leonard regarding AI liver disease. She has been taking care of her mom since the pandemic which at times is overwhelming. P/P with Dr. Solis; she is scheduling. MMG is UTD and she will have B-US breasts repeated in November. She worked with Dr. Hines regarding reported dizziness and determined her BP was low; adding in a lttle salt has helped tremendously. Paxil has helped her mood but she feels there is still room for improvement. Wishes to wait on Tdap. Left 2nd toe nail black/bruised. She is a runner and notes it was bothering her some last month.     Patient Active Problem List   Diagnosis   • Agatston coronary artery calcium score less than 100   • Dyslipidemia   • MTHFR mutation   • Hypercoagulable state (HCC)   • History of stress test   • Chest discomfort   • Elevated LFTs   • Palpitations   • PMDD (premenstrual dysphoric disorder)   • Prediabetes   • Sickle-cell trait (HCC)   • Lung nodule seen on imaging study   • Sinusitis chronic, ethmoidal   • Livedo reticularis   • Autoimmune liver disease   • Lightheadedness   • Anxiety   • Benign paroxysmal positional vertigo       No Known Allergies    Current Outpatient Medications on File Prior to Visit   Medication Sig Dispense Refill   • ascorbic acid (VITAMIN C) 250 MG chewable tablet chewable tablet Chew.     • Cholecalciferol 25 MCG (1000 UT) capsule Take 1 tablet by mouth Daily.     • cyanocobalamin (VITAMIN B-12) 1000 MCG tablet      • folic acid (FOLVITE) 1 MG tablet Take 1 tablet by mouth Daily. 90 tablet 3   • Iron, Ferrous Sulfate, 325 (65 Fe) MG tablet Take 1 tablet by mouth Daily.     •  "PARoxetine (PAXIL) 20 MG tablet TAKE 1 TABLET BY MOUTH EVERY DAY IN THE MORNING 90 tablet 0   • rosuvastatin (CRESTOR) 5 MG tablet TAKE 1 TABLET BY MOUTH EVERY DAY 90 tablet 3   • [DISCONTINUED] aspirin 81 MG EC tablet Take 1 tablet by mouth Daily.     • [DISCONTINUED] atorvastatin (Lipitor) 10 MG tablet Take 1 tablet by mouth Daily.     • [DISCONTINUED] cholecalciferol (VITAMIN D3) 25 MCG (1000 UT) tablet Take 2,000 Units by mouth Daily.     • [DISCONTINUED] Magnesium 500 MG capsule Take 500 mg by mouth Daily.       No current facility-administered medications on file prior to visit.       Past Medical History:   Diagnosis Date   • CAD (coronary artery disease)        Family History   Problem Relation Age of Onset   • Diabetes Mother    • Heart attack Father    • Heart disease Father    • Diabetes Sister    • Heart disease Brother    • Heart disease Maternal Grandmother    • Heart attack Maternal Grandmother        Social History     Socioeconomic History   • Marital status: Single   Tobacco Use   • Smoking status: Never Smoker   • Smokeless tobacco: Never Used   Vaping Use   • Vaping Use: Never used   Substance and Sexual Activity   • Alcohol use: Yes     Alcohol/week: 1.0 standard drink     Types: 1 Glasses of wine per week   • Drug use: Never   • Sexual activity: Defer       Past Surgical History:   Procedure Laterality Date   • MYOMECTOMY  2008       The following portions of the patient's history were reviewed and updated as appropriate: problem list, allergies, current medications, past medical history and past social history.    Review of Systems    Immunization History   Administered Date(s) Administered   • COVID-19 (PFIZER) PURPLE CAP 04/08/2021, 04/29/2021, 12/10/2021       Objective   Vitals:    08/04/22 1437   BP: 106/60   Pulse: 76   Resp: 12   Temp: 97.5 °F (36.4 °C)   Weight: 63 kg (139 lb)   Height: 167.6 cm (66\")     Body mass index is 22.44 kg/m².  Physical Exam  Vitals reviewed. "   Constitutional:       Appearance: Normal appearance. She is well-developed.   HENT:      Head: Normocephalic and atraumatic.      Right Ear: Tympanic membrane, ear canal and external ear normal.      Left Ear: Tympanic membrane, ear canal and external ear normal.      Nose: Nose normal.      Mouth/Throat:      Mouth: Mucous membranes are moist.      Pharynx: Oropharynx is clear. No oropharyngeal exudate or posterior oropharyngeal erythema.   Eyes:      General: No scleral icterus.     Extraocular Movements: Extraocular movements intact.      Conjunctiva/sclera: Conjunctivae normal.      Pupils: Pupils are equal, round, and reactive to light.   Neck:      Thyroid: No thyromegaly.      Vascular: No carotid bruit.   Cardiovascular:      Rate and Rhythm: Normal rate and regular rhythm.      Heart sounds: Normal heart sounds. No murmur heard.  Pulmonary:      Effort: Pulmonary effort is normal.      Breath sounds: Normal breath sounds.   Abdominal:      General: Bowel sounds are normal. There is no distension.      Palpations: Abdomen is soft.      Tenderness: There is no abdominal tenderness.   Genitourinary:     Comments: P/P & CBE with GYN.  Musculoskeletal:      Cervical back: Neck supple.      Comments: Ambulates without assistance; no clubbing, cyanosis or edema.    Lymphadenopathy:      Cervical: No cervical adenopathy.   Skin:     General: Skin is warm and dry.      Comments: Bruised 2nd left distal toenail. New nail appears to be growing up and under the nail.    Neurological:      Mental Status: She is alert.   Psychiatric:         Mood and Affect: Mood normal.         Behavior: Behavior normal.         Procedures    Assessment & Plan   Diagnoses and all orders for this visit:    1. Annual physical exam (Primary)  Comments:  150 minutes weekly aerobic exercise. Tdap at pharmacy as well as SG. Schedule CLS with Dr. Leonard and P/P with Dr. Solis.     2. Dyslipidemia  Comments:  Stable. Continue Crestor 5 mg  daily.     3. Prediabetes  Comments:  Stable. Continue dietary changes and 150 minutes weekly aerobic exercise.     4. Anxiety  Comments:  Much improved with Paxil 20 mg daily. Discussed increasing dose. She will consider and let me know.     5. Change in nail appearance  Comments:  Left 2nd toenail likely bruised with running. Discussed derm consult with names provided.     Records reviewed include previous OV with myself as well as labs.     Return in about 6 months (around 2/4/2023) for Lab B4 FUP.

## 2022-08-03 ENCOUNTER — TELEPHONE (OUTPATIENT)
Dept: CARDIOLOGY | Facility: CLINIC | Age: 52
End: 2022-08-03

## 2022-08-03 NOTE — TELEPHONE ENCOUNTER
Per Dr. Quach,   She does npt want to do a Nuclear Stress Test at this time. Notified , Ct, as well

## 2022-08-03 NOTE — TELEPHONE ENCOUNTER
8/3/22-Dr Philomena Hines  Pt: Darlene Arellano  : 72  Phone: [phone]  Reason for Call:  Pt. Called wanting to know if Dr Quach wanted to try to order the nuc. Stress test after the pt. Had the reg. TM. Her treadmill test was normal. This question was posed to dr Quach and she stated that no it was not necessary. Pt. Was called and made aware. This is just an FYI.

## 2022-08-04 ENCOUNTER — OFFICE VISIT (OUTPATIENT)
Dept: INTERNAL MEDICINE | Facility: CLINIC | Age: 52
End: 2022-08-04

## 2022-08-04 VITALS
TEMPERATURE: 97.5 F | HEIGHT: 66 IN | DIASTOLIC BLOOD PRESSURE: 60 MMHG | RESPIRATION RATE: 12 BRPM | WEIGHT: 139 LBS | BODY MASS INDEX: 22.34 KG/M2 | SYSTOLIC BLOOD PRESSURE: 106 MMHG | HEART RATE: 76 BPM

## 2022-08-04 DIAGNOSIS — Z00.00 ANNUAL PHYSICAL EXAM: Primary | ICD-10-CM

## 2022-08-04 DIAGNOSIS — F41.9 ANXIETY: Chronic | ICD-10-CM

## 2022-08-04 DIAGNOSIS — L60.8 CHANGE IN NAIL APPEARANCE: ICD-10-CM

## 2022-08-04 DIAGNOSIS — E78.5 DYSLIPIDEMIA: Chronic | ICD-10-CM

## 2022-08-04 DIAGNOSIS — R73.03 PREDIABETES: Chronic | ICD-10-CM

## 2022-08-04 PROCEDURE — 99396 PREV VISIT EST AGE 40-64: CPT | Performed by: NURSE PRACTITIONER

## 2022-08-25 DIAGNOSIS — F41.9 ANXIETY: Chronic | ICD-10-CM

## 2022-08-25 RX ORDER — PAROXETINE HYDROCHLORIDE 20 MG/1
20 TABLET, FILM COATED ORAL EVERY MORNING
Qty: 90 TABLET | Refills: 0 | Status: SHIPPED | OUTPATIENT
Start: 2022-08-25 | End: 2022-11-28 | Stop reason: SDUPTHER

## 2022-09-16 PROBLEM — R07.89 CHEST DISCOMFORT: Chronic | Status: ACTIVE | Noted: 2021-08-11

## 2022-09-16 NOTE — PROGRESS NOTES
"Chief Complaint  Follow-up (6 mo ), Chest Pain, Palpitations, and Hyperlipidemia    Subjective    History of Present Illness    I saw Ms. Arellano in the office.  She is a 52-year-old female with a positive family history of premature cardiac disease, dyslipidemia, prediabetes and positive MTHFR gene.  She had a stress echo in September 2019 which was normal.  She had a coronary calcium scan with a total score of 50. She had transthoracic echo in October 2021 which demonstrated normal ejection fraction with normal diastolic function.  She had mild mitral and tricuspid insufficiency.  She has a an exercise stress test on 5/6/2022.  She achieved 11.8 METS.  Normal hemodynamic response.  No electrocardiographic changes of ischemia.  Normal study    Darlene is actually doing very well.  No complaints of chest discomfort.  She states she feels like the chest discomfort was related to stress.  She has an occasional \"flutter\" sensation but no description of tachycardia.  She is generally a runner but has not run for approximately 2 months.  She is taking care of of her mother's financial and personal affairs.  No lower extremity edema, orthopnea or paroxysmal nocturnal dyspnea.      Objective   Vital Signs:   /64   Pulse 88   Resp 18   Ht 167.6 cm (66\")   Wt 64.2 kg (141 lb 9.6 oz)   SpO2 99%   BMI 22.85 kg/m²     Vitals and nursing note reviewed.   Constitutional:       General: Not in acute distress.     Appearance: Healthy appearance. Well-developed and not in distress. Not diaphoretic.   Eyes:      General: No scleral icterus.     Conjunctiva/sclera: Conjunctivae normal.      Pupils: Pupils are equal, round, and reactive to light.   HENT:      Head: Normocephalic and atraumatic.   Neck:      Thyroid: Thyroid normal. No thyromegaly.      Vascular: JVD normal.      Lymphadenopathy: No cervical adenopathy.   Pulmonary:      Effort: Pulmonary effort is normal. No respiratory distress.      Breath sounds: Normal " breath sounds. No wheezing. No rales.   Cardiovascular:      PMI at left midclavicular line. Normal rate. Regular rhythm. Normal S1. Normal S2.      Murmurs: There is no murmur.      No gallop. No S3 and S4 gallop. No click. No rub.   Pulses:     Intact distal pulses. No decreased pulses.   Edema:     Peripheral edema absent.   Abdominal:      General: Bowel sounds are normal. There is no abdominal bruit.      Palpations: Abdomen is soft.      Tenderness: There is no abdominal tenderness. There is no guarding or rebound.   Musculoskeletal: Normal range of motion.      Cervical back: Normal range of motion and neck supple. Skin:     General: Skin is warm and dry.      Coloration: Skin is not pale.      Findings: No rash.   Neurological:      Mental Status: Alert, oriented to person, place, and time and oriented to person, place and time.      Coordination: Coordination normal.      Gait: Gait is intact.   Psychiatric:         Behavior: Behavior normal.         Result Review :        Component   Ref Range & Units 1 mo ago 7 mo ago 11 mo ago 2 yr ago   Total Cholesterol   100 - 199 mg/dL 145  153  150 R, CM  183 R, CM    Triglycerides   0 - 149 mg/dL 53  53  48 R  60 R, CM    HDL Cholesterol   >39 mg/dL 73  71  66 High  R  61 R, CM    VLDL Cholesterol Palomo   5 - 40 mg/dL 11  11  11     LDL Chol Calc ([facility])   0 - 99 mg/dL 61  71  73 R  110 High  R, CM    Chol/HDL Ratio   0.0 - 4.4 ratio 2.0  2.2 CM  2.27 R     Comment:                                   T. Chol/HDL Ratio      Component   Ref Range & Units 1 mo ago   (7/26/22) 7 mo ago   (2/2/22) 11 mo ago   (9/28/21) 1 yr ago   (8/9/21) 1 yr ago   (8/9/21) 1 yr ago   (2/3/21) 3 yr ago   (5/10/19)   Glucose   65 - 99 mg/dL 97  105 High    94   90 R  95    BUN   6 - 24 mg/dL 11  14   12 R   10 R  13 R    Creatinine   0.57 - 1.00 mg/dL 0.85  0.87   0.87   0.7 R  0.78 R    EGFR Result   >59 mL/min/1.73 83          BUN/Creatinine Ratio   9 - 23 13  16   13.8 R   14.0 R      Sodium   134 - 144 mmol/L 140  141   142 R   138 R  141 R    Potassium   3.5 - 5.2 mmol/L 4.3  4.4   4.7   3.9 R  4.5 R    Chloride   96 - 106 mmol/L 103  103   104 R   102 R  102    Total CO2   20 - 29 mmol/L 24  23   26.9 R   27 R  25 R    Calcium   8.7 - 10.2 mg/dL 10.0  10.0   10.4 R   9.4 R  10.6 High  R    Total Protein   6.0 - 8.5 g/dL 7.2  7.4  7.3   7.6  7.5 R     Albumin   3.8 - 4.9 g/dL 4.7  4.8  4.80 R   4.60 R  4.5 R     Globulin   1.5 - 4.5 g/dL 2.5  2.6         A/G Ratio   1.2 - 2.2 1.9  1.8     1.5 R     Total Bilirubin   0.0 - 1.2 mg/dL 0.5  0.5  0.5   0.4  0.5 R     Alkaline Phosphatase   44 - 121 IU/L 138 High   125 High   123 High  R   134 High  R  96 R       Component   Ref Range & Units 1 mo ago   (7/26/22) 7 mo ago   (2/2/22) 1 yr ago   (8/9/21) 1 yr ago   (2/3/21)   Hemoglobin A1C   4.8 - 5.6 % 6.0 High   6.0 High  CM  5.90 High  R, CM  5.7 High  R, CM    Comment:          Prediabetes: 5.7 - 6.4            Diabetes: >6.4            Glycemic control for adults with diabetes: <7.0    Resulting Agency [facility]                    Assessment and Plan    1. Chest discomfort  She is having no further chest discomfort.  She had a normal stress test at a high level of exercise in May 2022.  I told her that I do not think that she needs further ischemic evaluation.  She will continue with risk factor modification.    2. Agatston coronary artery calcium score less than 100  No symptoms of angina.  She is on low-dose statin therapy.  She does have mild elevation of her ALT but this does not appear to be related to statin therapy.  She is being followed by GI.  We did discuss aspirin.  Her risk factors are positive family history of premature coronary disease, mild dyslipidemia and she is in TFR positive.  I told her I would be inclined to take 81 milligrams of aspirin daily.  She is not excited about taking aspirin.  She states that she will take aspirin  every other day.    3. Dyslipidemia  She is on rosuvastatin 5 mg daily.  Her LDL is 61.        Follow Up   No follow-ups on file.  Patient was given instructions and counseling regarding her condition or for health maintenance advice. Please see specific information pulled into the AVS if appropriate.

## 2022-09-19 ENCOUNTER — OFFICE VISIT (OUTPATIENT)
Dept: CARDIOLOGY | Facility: CLINIC | Age: 52
End: 2022-09-19

## 2022-09-19 VITALS
HEART RATE: 88 BPM | SYSTOLIC BLOOD PRESSURE: 108 MMHG | WEIGHT: 141.6 LBS | HEIGHT: 66 IN | DIASTOLIC BLOOD PRESSURE: 64 MMHG | OXYGEN SATURATION: 99 % | BODY MASS INDEX: 22.76 KG/M2 | RESPIRATION RATE: 18 BRPM

## 2022-09-19 DIAGNOSIS — E78.5 DYSLIPIDEMIA: Chronic | ICD-10-CM

## 2022-09-19 DIAGNOSIS — R07.89 CHEST DISCOMFORT: Primary | Chronic | ICD-10-CM

## 2022-09-19 DIAGNOSIS — R93.1 AGATSTON CORONARY ARTERY CALCIUM SCORE LESS THAN 100: Chronic | ICD-10-CM

## 2022-09-19 PROCEDURE — 99214 OFFICE O/P EST MOD 30 MIN: CPT | Performed by: INTERNAL MEDICINE

## 2022-11-18 ENCOUNTER — HOSPITAL ENCOUNTER (OUTPATIENT)
Dept: ULTRASOUND IMAGING | Facility: HOSPITAL | Age: 52
Discharge: HOME | End: 2022-11-18

## 2022-11-28 DIAGNOSIS — F41.9 ANXIETY: Chronic | ICD-10-CM

## 2022-11-28 DIAGNOSIS — I25.10 CORONARY ARTERY DISEASE INVOLVING NATIVE CORONARY ARTERY OF NATIVE HEART WITHOUT ANGINA PECTORIS: Chronic | ICD-10-CM

## 2022-11-28 DIAGNOSIS — E78.5 DYSLIPIDEMIA: Chronic | ICD-10-CM

## 2022-11-28 RX ORDER — ROSUVASTATIN CALCIUM 5 MG/1
5 TABLET, COATED ORAL DAILY
Qty: 90 TABLET | Refills: 3 | Status: SHIPPED | OUTPATIENT
Start: 2022-11-28 | End: 2022-11-29 | Stop reason: SDUPTHER

## 2022-11-28 RX ORDER — PAROXETINE HYDROCHLORIDE 20 MG/1
20 TABLET, FILM COATED ORAL EVERY MORNING
Qty: 90 TABLET | Refills: 0 | Status: SHIPPED | OUTPATIENT
Start: 2022-11-28 | End: 2022-11-29 | Stop reason: SDUPTHER

## 2022-11-29 DIAGNOSIS — I25.10 CORONARY ARTERY DISEASE INVOLVING NATIVE CORONARY ARTERY OF NATIVE HEART WITHOUT ANGINA PECTORIS: Chronic | ICD-10-CM

## 2022-11-29 DIAGNOSIS — F41.9 ANXIETY: Chronic | ICD-10-CM

## 2022-11-29 DIAGNOSIS — E78.5 DYSLIPIDEMIA: Chronic | ICD-10-CM

## 2022-11-29 RX ORDER — PAROXETINE HYDROCHLORIDE 20 MG/1
20 TABLET, FILM COATED ORAL EVERY MORNING
Qty: 90 TABLET | Refills: 0 | Status: SHIPPED | OUTPATIENT
Start: 2022-11-29 | End: 2023-03-03 | Stop reason: SDUPTHER

## 2022-11-29 RX ORDER — ROSUVASTATIN CALCIUM 5 MG/1
5 TABLET, COATED ORAL DAILY
Qty: 90 TABLET | Refills: 3 | Status: SHIPPED | OUTPATIENT
Start: 2022-11-29 | End: 2023-03-03 | Stop reason: SDUPTHER

## 2023-03-03 ENCOUNTER — TELEPHONE (OUTPATIENT)
Dept: INTERNAL MEDICINE | Facility: CLINIC | Age: 53
End: 2023-03-03

## 2023-03-03 DIAGNOSIS — F41.9 ANXIETY: Chronic | ICD-10-CM

## 2023-03-03 DIAGNOSIS — E78.5 DYSLIPIDEMIA: Chronic | ICD-10-CM

## 2023-03-03 DIAGNOSIS — I25.10 CORONARY ARTERY DISEASE INVOLVING NATIVE CORONARY ARTERY OF NATIVE HEART WITHOUT ANGINA PECTORIS: Chronic | ICD-10-CM

## 2023-03-03 RX ORDER — PAROXETINE HYDROCHLORIDE 20 MG/1
20 TABLET, FILM COATED ORAL EVERY MORNING
Qty: 90 TABLET | Refills: 1 | Status: SHIPPED | OUTPATIENT
Start: 2023-03-03

## 2023-03-03 RX ORDER — ROSUVASTATIN CALCIUM 5 MG/1
5 TABLET, COATED ORAL DAILY
Qty: 90 TABLET | Refills: 1 | Status: SHIPPED | OUTPATIENT
Start: 2023-03-03

## 2023-03-03 NOTE — TELEPHONE ENCOUNTER
Patient is wanting to know if she could get a refill on her Paxil and Crestor she does not have insurance right now and is wanting to know if she can have her refills called in until she get's back on insurance, patient said she can not just stop the medication, please send refills in to [facility], please and thank you.  If she does not answer her phone she is at work just leave a message  [phone]

## 2023-09-03 DIAGNOSIS — F41.9 ANXIETY: Chronic | ICD-10-CM

## 2023-09-05 RX ORDER — PAROXETINE HYDROCHLORIDE 20 MG/1
TABLET, FILM COATED ORAL
Qty: 90 TABLET | Refills: 1 | OUTPATIENT
Start: 2023-09-05

## 2023-09-06 DIAGNOSIS — F41.9 ANXIETY: Chronic | ICD-10-CM

## 2023-09-06 RX ORDER — PAROXETINE HYDROCHLORIDE 20 MG/1
TABLET, FILM COATED ORAL
Qty: 90 TABLET | Refills: 1 | OUTPATIENT
Start: 2023-09-06

## 2023-09-06 NOTE — TELEPHONE ENCOUNTER
Caller: Darlene Arellano    Relationship: Self    Best call back number: [phone]    Requested Prescriptions:   Requested Prescriptions     Refused Prescriptions Disp Refills    PARoxetine (PAXIL) 20 MG tablet [Pharmacy Med Name: PARoxetine HCL 20 MG TABLET] 90 tablet 1     Sig: TAKE ONE TABLET BY MOUTH EVERY MORNING     Refused By: JOSEPH VORA     Reason for Refusal: Other        Pharmacy where request should be sent: [facility] [id number] [place] [address] [address] [facility] & [address] [facility] [phone] [facility] [phone] FX     Last office visit with prescribing clinician: 8/4/2022   Last telemedicine visit with prescribing clinician: Visit date not found   Next office visit with prescribing clinician: 9/21/2023     Additional details provided by patient: PATIENT COMPLETELY OUT    Does the patient have less than a 3 day supply:  [x] Yes  [] No    Would you like a call back once the refill request has been completed: [] Yes [x] No    If the office needs to give you a call back, can they leave a voicemail: [] Yes [x] No    Karly Castro Rep   09/06/23 16:07 EDT

## 2023-09-07 DIAGNOSIS — F41.9 ANXIETY: Chronic | ICD-10-CM

## 2023-09-07 RX ORDER — PAROXETINE HYDROCHLORIDE 20 MG/1
TABLET, FILM COATED ORAL
Qty: 90 TABLET | Refills: 1 | Status: SHIPPED | OUTPATIENT
Start: 2023-09-07

## 2023-09-19 PROBLEM — Z92.89 HISTORY OF STRESS TEST: Status: RESOLVED | Noted: 2021-03-17 | Resolved: 2023-09-19

## 2023-09-19 PROBLEM — R93.1 AGATSTON CORONARY ARTERY CALCIUM SCORE LESS THAN 100: Chronic | Status: RESOLVED | Noted: 2021-03-11 | Resolved: 2023-09-19

## 2024-03-09 DIAGNOSIS — I25.10 CORONARY ARTERY DISEASE INVOLVING NATIVE CORONARY ARTERY OF NATIVE HEART WITHOUT ANGINA PECTORIS: Chronic | ICD-10-CM

## 2024-03-09 DIAGNOSIS — E78.5 DYSLIPIDEMIA: Chronic | ICD-10-CM

## 2024-03-09 RX ORDER — ROSUVASTATIN CALCIUM 5 MG/1
5 TABLET, COATED ORAL DAILY
Qty: 90 TABLET | Refills: 1 | OUTPATIENT
Start: 2024-03-09

## 2025-06-16 ENCOUNTER — HOSPITAL ENCOUNTER (EMERGENCY)
Facility: HOSPITAL | Age: 55
Discharge: HOME | End: 2025-06-16
Attending: EMERGENCY MEDICINE | Admitting: EMERGENCY MEDICINE
Payer: COMMERCIAL

## 2025-06-16 ENCOUNTER — APPOINTMENT (OUTPATIENT)
Dept: MRI IMAGING | Facility: HOSPITAL | Age: 55
End: 2025-06-16
Payer: COMMERCIAL

## 2025-06-16 VITALS
HEIGHT: 66 IN | HEART RATE: 69 BPM | OXYGEN SATURATION: 100 % | RESPIRATION RATE: 14 BRPM | BODY MASS INDEX: 23.42 KG/M2 | TEMPERATURE: 98.5 F | SYSTOLIC BLOOD PRESSURE: 110 MMHG | DIASTOLIC BLOOD PRESSURE: 70 MMHG | WEIGHT: 145.72 LBS

## 2025-06-16 DIAGNOSIS — G51.0 BELL'S PALSY: Primary | ICD-10-CM

## 2025-06-16 LAB
ALBUMIN SERPL-MCNC: 4.7 G/DL (ref 3.5–5.2)
ALBUMIN/GLOB SERPL: 1.3 G/DL
ALP SERPL-CCNC: 135 U/L (ref 39–117)
ALT SERPL W P-5'-P-CCNC: 45 U/L (ref 1–33)
ANION GAP SERPL CALCULATED.3IONS-SCNC: 9.9 MMOL/L (ref 5–15)
AST SERPL-CCNC: 42 U/L (ref 1–32)
BASOPHILS # BLD AUTO: 0.03 10*3/MM3 (ref 0–0.2)
BASOPHILS NFR BLD AUTO: 0.5 % (ref 0–1.5)
BILIRUB SERPL-MCNC: 0.3 MG/DL (ref 0–1.2)
BUN SERPL-MCNC: 12 MG/DL (ref 6–20)
BUN/CREAT SERPL: 12.6 (ref 7–25)
CALCIUM SPEC-SCNC: 10.2 MG/DL (ref 8.6–10.5)
CHLORIDE SERPL-SCNC: 102 MMOL/L (ref 98–107)
CO2 SERPL-SCNC: 27.1 MMOL/L (ref 22–29)
CREAT SERPL-MCNC: 0.95 MG/DL (ref 0.57–1)
DEPRECATED RDW RBC AUTO: 35.9 FL (ref 37–54)
EGFRCR SERPLBLD CKD-EPI 2021: 71.3 ML/MIN/1.73
EOSINOPHIL # BLD AUTO: 0.09 10*3/MM3 (ref 0–0.4)
EOSINOPHIL NFR BLD AUTO: 1.6 % (ref 0.3–6.2)
ERYTHROCYTE [DISTWIDTH] IN BLOOD BY AUTOMATED COUNT: 12.8 % (ref 12.3–15.4)
GLOBULIN UR ELPH-MCNC: 3.5 GM/DL
GLUCOSE BLDC GLUCOMTR-MCNC: 98 MG/DL (ref 70–130)
GLUCOSE SERPL-MCNC: 105 MG/DL (ref 65–99)
HCG SERPL QL: NEGATIVE
HCT VFR BLD AUTO: 43.6 % (ref 34–46.6)
HGB BLD-MCNC: 13.7 G/DL (ref 12–15.9)
HOLD SPECIMEN: NORMAL
HOLD SPECIMEN: NORMAL
IMM GRANULOCYTES # BLD AUTO: 0.01 10*3/MM3 (ref 0–0.05)
IMM GRANULOCYTES NFR BLD AUTO: 0.2 % (ref 0–0.5)
LYMPHOCYTES # BLD AUTO: 1.94 10*3/MM3 (ref 0.7–3.1)
LYMPHOCYTES NFR BLD AUTO: 33.7 % (ref 19.6–45.3)
MCH RBC QN AUTO: 24.8 PG (ref 26.6–33)
MCHC RBC AUTO-ENTMCNC: 31.4 G/DL (ref 31.5–35.7)
MCV RBC AUTO: 79 FL (ref 79–97)
MONOCYTES # BLD AUTO: 0.63 10*3/MM3 (ref 0.1–0.9)
MONOCYTES NFR BLD AUTO: 10.9 % (ref 5–12)
NEUTROPHILS NFR BLD AUTO: 3.06 10*3/MM3 (ref 1.7–7)
NEUTROPHILS NFR BLD AUTO: 53.1 % (ref 42.7–76)
PLATELET # BLD AUTO: 279 10*3/MM3 (ref 140–450)
PMV BLD AUTO: 11.2 FL (ref 6–12)
POTASSIUM SERPL-SCNC: 4.4 MMOL/L (ref 3.5–5.2)
PROT SERPL-MCNC: 8.2 G/DL (ref 6–8.5)
RBC # BLD AUTO: 5.52 10*6/MM3 (ref 3.77–5.28)
SODIUM SERPL-SCNC: 139 MMOL/L (ref 136–145)
WBC NRBC COR # BLD AUTO: 5.76 10*3/MM3 (ref 3.4–10.8)
WHOLE BLOOD HOLD COAG: NORMAL
WHOLE BLOOD HOLD SPECIMEN: NORMAL

## 2025-06-16 PROCEDURE — 85025 COMPLETE CBC W/AUTO DIFF WBC: CPT | Performed by: EMERGENCY MEDICINE

## 2025-06-16 PROCEDURE — 84703 CHORIONIC GONADOTROPIN ASSAY: CPT | Performed by: EMERGENCY MEDICINE

## 2025-06-16 PROCEDURE — 80053 COMPREHEN METABOLIC PANEL: CPT | Performed by: EMERGENCY MEDICINE

## 2025-06-16 PROCEDURE — 99284 EMERGENCY DEPT VISIT MOD MDM: CPT

## 2025-06-16 PROCEDURE — 70551 MRI BRAIN STEM W/O DYE: CPT

## 2025-06-16 PROCEDURE — 82948 REAGENT STRIP/BLOOD GLUCOSE: CPT

## 2025-06-16 RX ORDER — SODIUM CHLORIDE 0.9 % (FLUSH) 0.9 %
10 SYRINGE (ML) INJECTION AS NEEDED
Status: DISCONTINUED | OUTPATIENT
Start: 2025-06-16 | End: 2025-06-16 | Stop reason: HOSPADM

## 2025-06-16 RX ORDER — VALACYCLOVIR HYDROCHLORIDE 1 G/1
1000 TABLET, FILM COATED ORAL 3 TIMES DAILY
Qty: 21 TABLET | Refills: 0 | Status: SHIPPED | OUTPATIENT
Start: 2025-06-16 | End: 2025-06-23

## 2025-06-16 RX ORDER — ASPIRIN 81 MG/1
81 TABLET ORAL DAILY
COMMUNITY

## 2025-06-16 RX ORDER — PREDNISONE 50 MG/1
50 TABLET ORAL DAILY
Qty: 7 TABLET | Refills: 0 | Status: SHIPPED | OUTPATIENT
Start: 2025-06-16 | End: 2025-06-23

## 2025-06-16 RX ORDER — PAROXETINE 10 MG/1
20 TABLET, FILM COATED ORAL EVERY MORNING
COMMUNITY
End: 2025-06-16

## 2025-06-16 NOTE — ED PROVIDER NOTES
EMERGENCY DEPARTMENT ENCOUNTER  Room Number:  32/32  PCP: Provider, No Known  Independent Historians: Patient      HPI:  Chief Complaint: had concerns including Facial Droop (Started this morning).     A complete HPI/ROS/PMH/PSH/SH/FH are unobtainable due to: Nothing      Context: Darlene Arellano is a 55 y.o. female with a medical history of dyslipidemia, MTHFR mutation, sickle cell trait, who presents to the ED c/o acute right lower facial droop.  She had woken up last night around 4 AM to go to the bathroom and felt that something was a little numb or off on the right side of her face but did not pay attention to it because she was really tired.  When she woke up later this morning she did notice some mild droop involving the right side of her mouth.  She works in cosmetics  and she states she definitely noticed a difference when applying her make-up this morning.  She proceeded to go to work but symptoms persisted so she called her primary care doctor who recommended she come to the ER for evaluation.  She denies headache.  She does have a prior history of migraines but denies complex migraine or current headache.  She has strong family history of stroke including her father in his 30s and 2 siblings.  She does not smoke.  She drinks socially.  She denies any exogenous estrogen use.  She has had no speech or vision disturbance.  No extremity weakness or numbness.      Review of prior external notes (non-ED) -and- Review of prior external test results outside of this encounter: I reviewed PCP visit from 8/4/2022 where patient was seen in follow-up for dyslipidemia, prediabetes, anxiety.        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    Lightheadedness 09/17/2021    Anxiety 10/06/2021    Benign paroxysmal positional vertigo 01/31/2022     Resolved Ambulatory Problems     Diagnosis Date Noted    Agatston coronary artery calcium score less than 100 03/11/2021    CAD (coronary artery disease)     History of stress test 03/17/2021     No Additional Past Medical History         PAST SURGICAL HISTORY  Past Surgical History:   Procedure Laterality Date    MYOMECTOMY  2008         FAMILY HISTORY  Family History   Problem Relation Age of Onset    Diabetes Mother     Heart attack Father     Heart disease Father     Diabetes Sister     Heart disease Brother     Heart disease Maternal Grandmother     Heart attack Maternal Grandmother          SOCIAL HISTORY  Social History     Socioeconomic History    Marital status: Single   Tobacco Use    Smoking status: Never    Smokeless tobacco: Never   Vaping Use    Vaping status: Never Used   Substance and Sexual Activity    Alcohol use: Yes     Alcohol/week: 1.0 standard drink of alcohol     Types: 1 Glasses of wine per week    Drug use: Never    Sexual activity: Defer         ALLERGIES  Patient has no known allergies.      REVIEW OF SYSTEMS  Review of all 14 systems is negative other than stated in the HPI above.      PHYSICAL EXAM    I have reviewed the triage vital signs and nursing notes.    ED Triage Vitals   Temp Heart Rate Resp BP SpO2   06/16/25 1159 06/16/25 1159 06/16/25 1159 06/16/25 1210 06/16/25 1159   98.5 °F (36.9 °C) 85 18 118/69 97 %      Temp src Heart Rate Source Patient Position BP Location FiO2 (%)   -- -- -- -- --                GENERAL: awake and alert, no acute distress  HENT: Normocephalic, atraumatic.  There is mild drooping of the right side of the lower lip, however there is not  appreciable asymmetry when patient smiles.  EYES: no scleral icterus, extraocular movements intact, pupils 3 mm reactive bilaterally, visual fields full, no nystagmus.  CV: regular rhythm, regular rate  RESPIRATORY: normal effort  MUSCULOSKELETAL: no deformity  NEURO: alert, moves all extremities with normal strength and no drift, follows commands, cranial nerves II through XII are grossly intact without apparent facial droop with smiling however there is mild drooping of the right side of the lower lip at rest.  The forehead furrows symmetrically.  Eyes open and close fully bilaterally.  Tongue protrudes midline.  Grossly normal sensation to light touch throughout the face bilaterally.  Speech fluent and clear.  PSYCH: calm, cooperative  SKIN: Warm, dry          LAB RESULTS  Recent Results (from the past 24 hours)   POC Glucose Once    Collection Time: 06/16/25 12:05 PM    Specimen: Blood   Result Value Ref Range    Glucose 98 70 - 130 mg/dL   Green Top (Gel)    Collection Time: 06/16/25 12:08 PM   Result Value Ref Range    Extra Tube Hold for add-ons.    Lavender Top    Collection Time: 06/16/25 12:08 PM   Result Value Ref Range    Extra Tube hold for add-on    Gold Top - SST    Collection Time: 06/16/25 12:08 PM   Result Value Ref Range    Extra Tube Hold for add-ons.    Light Blue Top    Collection Time: 06/16/25 12:08 PM   Result Value Ref Range    Extra Tube Hold for add-ons.    Comprehensive Metabolic Panel    Collection Time: 06/16/25 12:08 PM    Specimen: Blood   Result Value Ref Range    Glucose 105 (H) 65 - 99 mg/dL    BUN 12.0 6.0 - 20.0 mg/dL    Creatinine 0.95 0.57 - 1.00 mg/dL    Sodium 139 136 - 145 mmol/L    Potassium 4.4 3.5 - 5.2 mmol/L    Chloride 102 98 - 107 mmol/L    CO2 27.1 22.0 - 29.0 mmol/L    Calcium 10.2 8.6 - 10.5 mg/dL    Total Protein 8.2 6.0 - 8.5 g/dL    Albumin 4.7 3.5 - 5.2 g/dL    ALT (SGPT) 45 (H) 1 - 33 U/L    AST (SGOT) 42 (H) 1 - 32 U/L    Alkaline Phosphatase 135 (H) 39 -  117 U/L    Total Bilirubin 0.3 0.0 - 1.2 mg/dL    Globulin 3.5 gm/dL    A/G Ratio 1.3 g/dL    BUN/Creatinine Ratio 12.6 7.0 - 25.0    Anion Gap 9.9 5.0 - 15.0 mmol/L    eGFR 71.3 >60.0 mL/min/1.73   hCG, Serum, Qualitative    Collection Time: 06/16/25 12:08 PM    Specimen: Blood   Result Value Ref Range    HCG Qualitative Negative Negative   CBC Auto Differential    Collection Time: 06/16/25 12:08 PM    Specimen: Blood   Result Value Ref Range    WBC 5.76 3.40 - 10.80 10*3/mm3    RBC 5.52 (H) 3.77 - 5.28 10*6/mm3    Hemoglobin 13.7 12.0 - 15.9 g/dL    Hematocrit 43.6 34.0 - 46.6 %    MCV 79.0 79.0 - 97.0 fL    MCH 24.8 (L) 26.6 - 33.0 pg    MCHC 31.4 (L) 31.5 - 35.7 g/dL    RDW 12.8 12.3 - 15.4 %    RDW-SD 35.9 (L) 37.0 - 54.0 fl    MPV 11.2 6.0 - 12.0 fL    Platelets 279 140 - 450 10*3/mm3    Neutrophil % 53.1 42.7 - 76.0 %    Lymphocyte % 33.7 19.6 - 45.3 %    Monocyte % 10.9 5.0 - 12.0 %    Eosinophil % 1.6 0.3 - 6.2 %    Basophil % 0.5 0.0 - 1.5 %    Immature Grans % 0.2 0.0 - 0.5 %    Neutrophils, Absolute 3.06 1.70 - 7.00 10*3/mm3    Lymphocytes, Absolute 1.94 0.70 - 3.10 10*3/mm3    Monocytes, Absolute 0.63 0.10 - 0.90 10*3/mm3    Eosinophils, Absolute 0.09 0.00 - 0.40 10*3/mm3    Basophils, Absolute 0.03 0.00 - 0.20 10*3/mm3    Immature Grans, Absolute 0.01 0.00 - 0.05 10*3/mm3       The above labs were ordered by me and independently reviewed by me.     RADIOLOGY  MRI Brain Without Contrast  Result Date: 6/16/2025  MRI OF THE BRAIN WITHOUT CONTRAST  CLINICAL HISTORY: Acute onset right inferior facial droop beginning this morning  TECHNIQUE: MRI of the brain was obtained with sagittal T1, axial T1, axial FLAIR, axial T2, axial diffusion, and axial gradient echo images.  COMPARISON: None available  FINDINGS:  No acute infarct or hemorrhage.  No mass effect or herniation.  White matter signal intensities are normal for age.   No hydrocephalus or extra-axial fluid collections.  Preserved flow voids.   Extracranial visualized structures are within normal limits.        No noncontrast MRI finding to account for patient's acute onset right inferior facial droop. Specifically, no noncontrast MRI evidence of acute infarct.    This report was finalized on 6/16/2025 3:45 PM by Dr. Cam Pena M.D on Workstation: [id number]        The above radiology studies were ordered by me.  See ED course for independent interpretations.     MEDICATIONS GIVEN IN ER  Medications   sodium chloride 0.9 % flush 10 mL (has no administration in time range)   sodium chloride 0.9 % flush 10 mL (has no administration in time range)         ORDERS PLACED DURING THIS VISIT:  Orders Placed This Encounter   Procedures    MRI Brain Without Contrast    [place] Draw    Comprehensive Metabolic Panel    hCG, Serum, Qualitative    CBC Auto Differential    Continuous Pulse Oximetry    Monitor Blood Pressure    POC Glucose Once    Insert peripheral IV    Insert Peripheral IV    Green Top (Gel)    Lavender Top    Gold Top - SST    Light Blue Top    CBC & Differential         OUTPATIENT MEDICATION MANAGEMENT:  Current Facility-Administered Medications Ordered in Epic   Medication Dose Route Frequency Provider Last Rate Last Admin    sodium chloride 0.9 % flush 10 mL  10 mL Intravenous PRN Noe Whitehead MD        sodium chloride 0.9 % flush 10 mL  10 mL Intravenous PRN Noe Whitehead MD         Current Outpatient Medications Ordered in Epic   Medication Sig Dispense Refill    ascorbic acid (VITAMIN C) 250 MG chewable tablet chewable tablet Chew.      aspirin 81 MG EC tablet Take 1 tablet by mouth Daily.      Cholecalciferol 25 MCG (1000 UT) capsule Take 1 tablet by mouth Daily.      cyanocobalamin (VITAMIN B-12) 1000 MCG tablet       folic acid (FOLVITE) 1 MG tablet Take 1 tablet by mouth Daily. 90 tablet 3    Iron, Ferrous Sulfate, 325 (65 Fe) MG tablet Take 1 tablet by mouth Daily.      PARoxetine (PAXIL) 20 MG tablet TAKE ONE TABLET  BY MOUTH EVERY MORNING 90 tablet 1    rosuvastatin (CRESTOR) 5 MG tablet Take 1 tablet by mouth Daily. 90 tablet 1    predniSONE (DELTASONE) 50 MG tablet Take 1 tablet by mouth Daily for 7 days. 7 tablet 0    valACYclovir (VALTREX) 1000 MG tablet Take 1 tablet by mouth 3 (Three) Times a Day for 7 days. 21 tablet 0         PROCEDURES  Procedures            PROGRESS, DATA ANALYSIS, CONSULTS, AND MEDICAL DECISION MAKING  All labs have been independently interpreted by me.  All radiology studies have been reviewed by me. All EKG's have been independently viewed and interpreted by me.  Discussion below represents my analysis of pertinent findings related to patient's condition, differential diagnosis, treatment plan and final disposition.    Differential diagnosis includes but is not limited to:  Acute ischemic stroke  Bell's palsy  Multiple sclerosis  Myasthenia gravis  Complex migraine      Clinical Scores:                  ED Course as of 06/16/25 1631   Mon Jun 16, 2025   1522 ALT (SGPT)(!): 45 [JR]   1522 AST (SGOT)(!): 42 [JR]   1522 Alkaline Phosphatase(!): 135 [JR]      ED Course User Index  [JR] Noe Whitehead MD       Patient's MRI brain without contrast is negative for acute ischemic stroke or other acute abnormality.  I explained to her that I am concerned for possible early or mild Bell's palsy and I recommended empiric treatment with Valtrex and prednisone.  She is not currently having any ocular involvement to require lubricating eyedrops or taping of her eyelid however I did explain that this may evolve and be necessary.  I discussed return precautions and need for PCP follow-up.  She does have mild elevation of her liver enzymes which is chronic and she is aware of this.      AS OF 16:31 EDT VITALS:    BP - 110/70  HR - 69  TEMP - 98.5 °F (36.9 °C)  O2 SATS - 100%    COMPLEXITY OF CARE        Chronic or social conditions impacting patient care (Social Determinants of Health):     DIAGNOSIS  Final  diagnoses:   Bell's palsy           DISPOSITION  DISCHARGE    Patient discharged in stable condition.    Reviewed implications of results, diagnosis, meds, responsibility to follow up, warning signs and symptoms of possible worsening, potential complications and reasons to return to ER.    Patient/Family voiced understanding of above instructions.    Discussed plan for discharge, as there is no emergent indication for admission. Patient referred to primary care provider for BP management due to today's BP. Pt/family is agreeable and understands need for follow up and repeat testing.  Pt is aware that discharge does not mean that nothing is wrong but it indicates no emergency is present that requires admission and they must continue care with follow-up as given below or physician of their choice.     FOLLOW-UP  PATIENT CONNECTION - [place] [address]  [phone]  Call in 1 day           Medication List        New Prescriptions      predniSONE 50 MG tablet  Commonly known as: DELTASONE  Take 1 tablet by mouth Daily for 7 days.     valACYclovir 1000 MG tablet  Commonly known as: VALTREX  Take 1 tablet by mouth 3 (Three) Times a Day for 7 days.               Where to Get Your Medications        These medications were sent to [facility] [id number] - [place], [place] - [address] AT [address] - [phone]  - [phone]   [address], [place] [address]      Phone: [phone]   predniSONE 50 MG tablet  valACYclovir 1000 MG tablet             Prescription drug monitoring program review:           Please note that portions of this document were completed with a voice recognition program.    Note Disclaimer: At [facility], we believe that sharing information builds trust and better relationships. You are receiving this note because you recently visited [facility]. It is possible you will see health information before a provider has talked  with you about it. This kind of information can be easy to misunderstand. To help you fully understand what it means for your health, we urge you to discuss this note with your provider.         Noe Whitehead MD  06/16/25 2884

## 2025-06-16 NOTE — Clinical Note
[facility] EMERGENCY DEPARTMENT  [address] [facility] [address]  Phone: [phone]    Darlene Arellano was seen and treated in our emergency department on 6/16/2025.  She may return to work on 06/17/2025.         Thank you for choosing [facility].    Noe Whitehead MD

## 2025-07-29 NOTE — PATIENT INSTRUCTIONS
Please be sure to schedule a skin check with your dermatologist; if you do not have a dermatologist, I recommend Dr. Jose Mcguire ([facility]), Dr. Franklyn Muhammad or Dr. Brianna Avilez.     No